# Patient Record
Sex: FEMALE | Race: WHITE | NOT HISPANIC OR LATINO | URBAN - METROPOLITAN AREA
[De-identification: names, ages, dates, MRNs, and addresses within clinical notes are randomized per-mention and may not be internally consistent; named-entity substitution may affect disease eponyms.]

---

## 2017-10-04 ENCOUNTER — ON CAMPUS - OUTPATIENT (AMBULATORY)
Dept: URBAN - METROPOLITAN AREA HOSPITAL 77 | Facility: HOSPITAL | Age: 67
End: 2017-10-04
Payer: MEDICAID

## 2017-10-04 DIAGNOSIS — K63.89 OTHER SPECIFIED DISEASES OF INTESTINE: ICD-10-CM

## 2017-10-04 DIAGNOSIS — Z86.010 PERSONAL HISTORY OF COLONIC POLYPS: ICD-10-CM

## 2017-10-04 DIAGNOSIS — K64.8 OTHER HEMORRHOIDS: ICD-10-CM

## 2017-10-04 DIAGNOSIS — K57.90 DIVERTICULOSIS OF INTESTINE, PART UNSPECIFIED, WITHOUT PERFO: ICD-10-CM

## 2017-10-04 DIAGNOSIS — R13.10 DYSPHAGIA, UNSPECIFIED: ICD-10-CM

## 2017-10-04 DIAGNOSIS — K56.699 OTHER INTESTINAL OBSTRUCTION UNSPECIFIED AS TO PARTIAL VERSU: ICD-10-CM

## 2017-10-04 DIAGNOSIS — D12.0 BENIGN NEOPLASM OF CECUM: ICD-10-CM

## 2017-10-04 DIAGNOSIS — K22.2 ESOPHAGEAL OBSTRUCTION: ICD-10-CM

## 2017-10-04 DIAGNOSIS — R11.0 NAUSEA: ICD-10-CM

## 2017-10-04 PROCEDURE — 45385 COLONOSCOPY W/LESION REMOVAL: CPT | Mod: PT | Performed by: INTERNAL MEDICINE

## 2017-10-04 PROCEDURE — 45386 COLONOSCOPY W/BALLOON DILAT: CPT | Performed by: INTERNAL MEDICINE

## 2017-10-04 PROCEDURE — 43235 EGD DIAGNOSTIC BRUSH WASH: CPT | Mod: 59 | Performed by: INTERNAL MEDICINE

## 2017-10-04 PROCEDURE — 43450 DILATE ESOPHAGUS 1/MULT PASS: CPT | Performed by: INTERNAL MEDICINE

## 2018-04-11 ENCOUNTER — OFFICE (AMBULATORY)
Dept: URBAN - METROPOLITAN AREA CLINIC 64 | Facility: CLINIC | Age: 68
End: 2018-04-11

## 2018-04-11 VITALS
SYSTOLIC BLOOD PRESSURE: 113 MMHG | WEIGHT: 158 LBS | DIASTOLIC BLOOD PRESSURE: 65 MMHG | HEART RATE: 70 BPM | HEIGHT: 60 IN

## 2018-04-11 DIAGNOSIS — Z91.14 PATIENT'S OTHER NONCOMPLIANCE WITH MEDICATION REGIMEN: ICD-10-CM

## 2018-04-11 DIAGNOSIS — Z86.010 PERSONAL HISTORY OF COLONIC POLYPS: ICD-10-CM

## 2018-04-11 DIAGNOSIS — R19.7 DIARRHEA, UNSPECIFIED: ICD-10-CM

## 2018-04-11 DIAGNOSIS — K21.9 GASTRO-ESOPHAGEAL REFLUX DISEASE WITHOUT ESOPHAGITIS: ICD-10-CM

## 2018-04-11 DIAGNOSIS — K63.89 OTHER SPECIFIED DISEASES OF INTESTINE: ICD-10-CM

## 2018-04-11 PROCEDURE — 99214 OFFICE O/P EST MOD 30 MIN: CPT | Performed by: NURSE PRACTITIONER

## 2018-04-12 ENCOUNTER — OFFICE (AMBULATORY)
Dept: URBAN - METROPOLITAN AREA LAB 2 | Facility: LAB | Age: 68
End: 2018-04-12
Payer: MEDICARE

## 2018-04-12 DIAGNOSIS — B96.20 UNSPECIFIED ESCHERICHIA COLI [E. COLI] AS THE CAUSE OF DISEA: ICD-10-CM

## 2018-04-12 DIAGNOSIS — A08.2 ADENOVIRAL ENTERITIS: ICD-10-CM

## 2018-04-12 DIAGNOSIS — R19.7 DIARRHEA, UNSPECIFIED: ICD-10-CM

## 2018-04-12 DIAGNOSIS — A04.72 ENTEROCOLITIS DUE TO CLOSTRIDIUM DIFFICILE, NOT SPECIFIED AS: ICD-10-CM

## 2018-04-12 PROCEDURE — 87507 IADNA-DNA/RNA PROBE TQ 12-25: CPT | Performed by: NURSE PRACTITIONER

## 2018-05-16 ENCOUNTER — OFFICE (AMBULATORY)
Dept: URBAN - METROPOLITAN AREA CLINIC 64 | Facility: CLINIC | Age: 68
End: 2018-05-16

## 2018-05-16 VITALS
HEART RATE: 66 BPM | DIASTOLIC BLOOD PRESSURE: 93 MMHG | SYSTOLIC BLOOD PRESSURE: 151 MMHG | HEIGHT: 60 IN | WEIGHT: 151 LBS

## 2018-05-16 DIAGNOSIS — F11.20 OPIOID DEPENDENCE, UNCOMPLICATED: ICD-10-CM

## 2018-05-16 DIAGNOSIS — A08.2 ADENOVIRAL ENTERITIS: ICD-10-CM

## 2018-05-16 DIAGNOSIS — R53.83 OTHER FATIGUE: ICD-10-CM

## 2018-05-16 DIAGNOSIS — B96.20 UNSPECIFIED ESCHERICHIA COLI [E. COLI] AS THE CAUSE OF DISEA: ICD-10-CM

## 2018-05-16 DIAGNOSIS — K59.00 CONSTIPATION, UNSPECIFIED: ICD-10-CM

## 2018-05-16 DIAGNOSIS — A04.72 ENTEROCOLITIS DUE TO CLOSTRIDIUM DIFFICILE, NOT SPECIFIED AS: ICD-10-CM

## 2018-05-16 PROCEDURE — 99214 OFFICE O/P EST MOD 30 MIN: CPT | Performed by: NURSE PRACTITIONER

## 2018-05-24 ENCOUNTER — HOSPITAL ENCOUNTER (OUTPATIENT)
Dept: ONCOLOGY | Facility: HOSPITAL | Age: 68
Discharge: HOME | End: 2018-05-24
Attending: INTERNAL MEDICINE | Admitting: INTERNAL MEDICINE

## 2018-05-24 ENCOUNTER — HOSPITAL ENCOUNTER (OUTPATIENT)
Dept: ONCOLOGY | Facility: CLINIC | Age: 68
Setting detail: INFUSION SERIES
Discharge: HOME | End: 2018-05-24
Attending: INTERNAL MEDICINE | Admitting: INTERNAL MEDICINE

## 2018-05-24 ENCOUNTER — CLINICAL SUPPORT (OUTPATIENT)
Dept: ONCOLOGY | Facility: HOSPITAL | Age: 68
End: 2018-05-24

## 2018-05-24 NOTE — PROGRESS NOTES
PATIENTS ONCOLOGY RECORD LOCATED IN [facility]      Subjective     Name:  CHARLOTTE REID     Date:  2018  Address:  [address] [place] [id number]  Home: [phone]  :  1950 AGE:  67 y.o.        RECORDS OBTAINED:  Patients Oncology Record is located in [facility]

## 2018-05-25 LAB
ANA SER QL IA: NORMAL

## 2018-05-31 ENCOUNTER — HOSPITAL ENCOUNTER (OUTPATIENT)
Dept: ONCOLOGY | Facility: CLINIC | Age: 68
Setting detail: INFUSION SERIES
Discharge: HOME | End: 2018-05-31
Attending: INTERNAL MEDICINE | Admitting: INTERNAL MEDICINE

## 2018-05-31 ENCOUNTER — HOSPITAL ENCOUNTER (OUTPATIENT)
Dept: ONCOLOGY | Facility: HOSPITAL | Age: 68
Discharge: HOME | End: 2018-05-31
Attending: NURSE PRACTITIONER | Admitting: NURSE PRACTITIONER

## 2018-05-31 ENCOUNTER — CLINICAL SUPPORT (OUTPATIENT)
Dept: ONCOLOGY | Facility: HOSPITAL | Age: 68
End: 2018-05-31

## 2018-05-31 NOTE — PROGRESS NOTES
PATIENTS ONCOLOGY RECORD LOCATED IN [facility]      Subjective     Name:  CHARLOTTE REID     Date:  2018  Address:  [address] [place] [id number]  Home: [phone]  :  1950 AGE:  67 y.o.        RECORDS OBTAINED:  Patients Oncology Record is located in [facility]

## 2018-07-02 ENCOUNTER — CLINICAL SUPPORT (OUTPATIENT)
Dept: ONCOLOGY | Facility: HOSPITAL | Age: 68
End: 2018-07-02

## 2018-07-02 ENCOUNTER — HOSPITAL ENCOUNTER (OUTPATIENT)
Dept: ONCOLOGY | Facility: CLINIC | Age: 68
Setting detail: INFUSION SERIES
Discharge: HOME | End: 2018-07-02
Attending: INTERNAL MEDICINE | Admitting: INTERNAL MEDICINE

## 2018-07-02 NOTE — PROGRESS NOTES
PATIENTS ONCOLOGY RECORD LOCATED IN [facility]      Subjective     Name:  CHARLOTTE REID     Date:  2018  Address:  [address] [place] [id number]  Home: [phone]  :  1950 AGE:  67 y.o.        RECORDS OBTAINED:  Patients Oncology Record is located in [facility]

## 2018-07-22 ENCOUNTER — ON CAMPUS - OUTPATIENT (AMBULATORY)
Dept: URBAN - METROPOLITAN AREA HOSPITAL 77 | Facility: HOSPITAL | Age: 68
End: 2018-07-22

## 2018-07-22 DIAGNOSIS — R19.7 DIARRHEA, UNSPECIFIED: ICD-10-CM

## 2018-07-22 DIAGNOSIS — R14.0 ABDOMINAL DISTENSION (GASEOUS): ICD-10-CM

## 2018-07-22 PROCEDURE — 99214 OFFICE O/P EST MOD 30 MIN: CPT | Performed by: INTERNAL MEDICINE

## 2018-08-01 ENCOUNTER — OFFICE (AMBULATORY)
Dept: URBAN - METROPOLITAN AREA CLINIC 64 | Facility: CLINIC | Age: 68
End: 2018-08-01

## 2018-08-01 VITALS
SYSTOLIC BLOOD PRESSURE: 136 MMHG | WEIGHT: 152 LBS | DIASTOLIC BLOOD PRESSURE: 82 MMHG | HEART RATE: 74 BPM | HEIGHT: 60 IN

## 2018-08-01 DIAGNOSIS — R10.11 RIGHT UPPER QUADRANT PAIN: ICD-10-CM

## 2018-08-01 DIAGNOSIS — K21.9 GASTRO-ESOPHAGEAL REFLUX DISEASE WITHOUT ESOPHAGITIS: ICD-10-CM

## 2018-08-01 DIAGNOSIS — R14.0 ABDOMINAL DISTENSION (GASEOUS): ICD-10-CM

## 2018-08-01 PROCEDURE — 99214 OFFICE O/P EST MOD 30 MIN: CPT | Performed by: NURSE PRACTITIONER

## 2018-08-01 RX ORDER — SIMETHICONE 250 MG/1
CAPSULE, GELATIN COATED ORAL
Qty: 250 | Refills: 11 | Status: COMPLETED
Start: 2018-08-01 | End: 2018-10-11

## 2018-08-01 RX ORDER — HYOSCYAMINE SULFATE 0.12 MG/1
0.38 TABLET ORAL; SUBLINGUAL
Qty: 90 | Refills: 4 | Status: COMPLETED
Start: 2018-08-01 | End: 2018-10-11

## 2018-08-06 ENCOUNTER — HOSPITAL ENCOUNTER (OUTPATIENT)
Dept: ONCOLOGY | Facility: CLINIC | Age: 68
Setting detail: INFUSION SERIES
Discharge: HOME | End: 2018-08-06
Attending: INTERNAL MEDICINE | Admitting: INTERNAL MEDICINE

## 2018-10-11 ENCOUNTER — OFFICE (AMBULATORY)
Dept: URBAN - METROPOLITAN AREA CLINIC 64 | Facility: CLINIC | Age: 68
End: 2018-10-11

## 2018-10-11 VITALS
HEIGHT: 60 IN | WEIGHT: 153 LBS | SYSTOLIC BLOOD PRESSURE: 128 MMHG | DIASTOLIC BLOOD PRESSURE: 76 MMHG | HEART RATE: 77 BPM

## 2018-10-11 DIAGNOSIS — R14.0 ABDOMINAL DISTENSION (GASEOUS): ICD-10-CM

## 2018-10-11 DIAGNOSIS — R19.4 CHANGE IN BOWEL HABIT: ICD-10-CM

## 2018-10-11 PROCEDURE — 99214 OFFICE O/P EST MOD 30 MIN: CPT | Performed by: INTERNAL MEDICINE

## 2018-12-19 ENCOUNTER — INPATIENT HOSPITAL (AMBULATORY)
Dept: URBAN - METROPOLITAN AREA HOSPITAL 76 | Facility: HOSPITAL | Age: 68
End: 2018-12-19

## 2018-12-19 DIAGNOSIS — R10.13 EPIGASTRIC PAIN: ICD-10-CM

## 2018-12-19 DIAGNOSIS — R94.5 ABNORMAL RESULTS OF LIVER FUNCTION STUDIES: ICD-10-CM

## 2018-12-19 DIAGNOSIS — Z96.9 PRESENCE OF FUNCTIONAL IMPLANT, UNSPECIFIED: ICD-10-CM

## 2018-12-19 DIAGNOSIS — R11.2 NAUSEA WITH VOMITING, UNSPECIFIED: ICD-10-CM

## 2018-12-19 PROCEDURE — 99222 1ST HOSP IP/OBS MODERATE 55: CPT | Mod: 25 | Performed by: INTERNAL MEDICINE

## 2018-12-19 PROCEDURE — 43235 EGD DIAGNOSTIC BRUSH WASH: CPT | Performed by: INTERNAL MEDICINE

## 2018-12-20 ENCOUNTER — INPATIENT HOSPITAL (AMBULATORY)
Dept: URBAN - METROPOLITAN AREA HOSPITAL 76 | Facility: HOSPITAL | Age: 68
End: 2018-12-20

## 2018-12-20 DIAGNOSIS — R94.5 ABNORMAL RESULTS OF LIVER FUNCTION STUDIES: ICD-10-CM

## 2018-12-20 DIAGNOSIS — K59.04 CHRONIC IDIOPATHIC CONSTIPATION: ICD-10-CM

## 2018-12-20 DIAGNOSIS — R11.2 NAUSEA WITH VOMITING, UNSPECIFIED: ICD-10-CM

## 2018-12-20 DIAGNOSIS — R10.13 EPIGASTRIC PAIN: ICD-10-CM

## 2018-12-20 PROCEDURE — 99232 SBSQ HOSP IP/OBS MODERATE 35: CPT | Performed by: NURSE PRACTITIONER

## 2018-12-21 ENCOUNTER — INPATIENT HOSPITAL (AMBULATORY)
Dept: URBAN - METROPOLITAN AREA HOSPITAL 76 | Facility: HOSPITAL | Age: 68
End: 2018-12-21

## 2018-12-21 DIAGNOSIS — R11.2 NAUSEA WITH VOMITING, UNSPECIFIED: ICD-10-CM

## 2018-12-21 DIAGNOSIS — R10.13 EPIGASTRIC PAIN: ICD-10-CM

## 2018-12-21 DIAGNOSIS — R94.5 ABNORMAL RESULTS OF LIVER FUNCTION STUDIES: ICD-10-CM

## 2018-12-21 DIAGNOSIS — K59.04 CHRONIC IDIOPATHIC CONSTIPATION: ICD-10-CM

## 2018-12-21 PROCEDURE — 99231 SBSQ HOSP IP/OBS SF/LOW 25: CPT | Performed by: NURSE PRACTITIONER

## 2018-12-27 ENCOUNTER — INPATIENT HOSPITAL (AMBULATORY)
Dept: URBAN - METROPOLITAN AREA HOSPITAL 84 | Facility: HOSPITAL | Age: 68
End: 2018-12-27

## 2018-12-27 DIAGNOSIS — R11.2 NAUSEA WITH VOMITING, UNSPECIFIED: ICD-10-CM

## 2018-12-27 DIAGNOSIS — R10.84 GENERALIZED ABDOMINAL PAIN: ICD-10-CM

## 2018-12-27 PROCEDURE — 99222 1ST HOSP IP/OBS MODERATE 55: CPT | Performed by: NURSE PRACTITIONER

## 2018-12-28 ENCOUNTER — INPATIENT HOSPITAL (AMBULATORY)
Dept: URBAN - METROPOLITAN AREA HOSPITAL 84 | Facility: HOSPITAL | Age: 68
End: 2018-12-28

## 2018-12-28 DIAGNOSIS — R10.13 EPIGASTRIC PAIN: ICD-10-CM

## 2018-12-28 DIAGNOSIS — R11.2 NAUSEA WITH VOMITING, UNSPECIFIED: ICD-10-CM

## 2018-12-28 PROCEDURE — 44360 SMALL BOWEL ENDOSCOPY: CPT | Performed by: INTERNAL MEDICINE

## 2018-12-29 ENCOUNTER — INPATIENT HOSPITAL (AMBULATORY)
Dept: URBAN - METROPOLITAN AREA HOSPITAL 84 | Facility: HOSPITAL | Age: 68
End: 2018-12-29

## 2018-12-29 DIAGNOSIS — R10.9 UNSPECIFIED ABDOMINAL PAIN: ICD-10-CM

## 2018-12-29 DIAGNOSIS — E11.9 TYPE 2 DIABETES MELLITUS WITHOUT COMPLICATIONS: ICD-10-CM

## 2018-12-29 DIAGNOSIS — R11.2 NAUSEA WITH VOMITING, UNSPECIFIED: ICD-10-CM

## 2018-12-29 DIAGNOSIS — R94.5 ABNORMAL RESULTS OF LIVER FUNCTION STUDIES: ICD-10-CM

## 2018-12-29 DIAGNOSIS — K21.9 GASTRO-ESOPHAGEAL REFLUX DISEASE WITHOUT ESOPHAGITIS: ICD-10-CM

## 2018-12-29 PROCEDURE — 99232 SBSQ HOSP IP/OBS MODERATE 35: CPT | Performed by: NURSE PRACTITIONER

## 2018-12-31 ENCOUNTER — INPATIENT HOSPITAL (AMBULATORY)
Dept: URBAN - METROPOLITAN AREA HOSPITAL 84 | Facility: HOSPITAL | Age: 68
End: 2018-12-31

## 2018-12-31 DIAGNOSIS — R10.10 UPPER ABDOMINAL PAIN, UNSPECIFIED: ICD-10-CM

## 2018-12-31 DIAGNOSIS — K21.9 GASTRO-ESOPHAGEAL REFLUX DISEASE WITHOUT ESOPHAGITIS: ICD-10-CM

## 2018-12-31 DIAGNOSIS — E11.9 TYPE 2 DIABETES MELLITUS WITHOUT COMPLICATIONS: ICD-10-CM

## 2018-12-31 DIAGNOSIS — R94.5 ABNORMAL RESULTS OF LIVER FUNCTION STUDIES: ICD-10-CM

## 2018-12-31 DIAGNOSIS — K59.00 CONSTIPATION, UNSPECIFIED: ICD-10-CM

## 2018-12-31 DIAGNOSIS — R11.2 NAUSEA WITH VOMITING, UNSPECIFIED: ICD-10-CM

## 2018-12-31 PROCEDURE — 99232 SBSQ HOSP IP/OBS MODERATE 35: CPT | Performed by: NURSE PRACTITIONER

## 2019-01-07 ENCOUNTER — OFFICE (AMBULATORY)
Dept: URBAN - METROPOLITAN AREA CLINIC 64 | Facility: CLINIC | Age: 69
End: 2019-01-07

## 2019-01-07 VITALS
HEIGHT: 60 IN | DIASTOLIC BLOOD PRESSURE: 63 MMHG | WEIGHT: 150 LBS | SYSTOLIC BLOOD PRESSURE: 109 MMHG | HEART RATE: 80 BPM

## 2019-01-07 DIAGNOSIS — R94.5 ABNORMAL RESULTS OF LIVER FUNCTION STUDIES: ICD-10-CM

## 2019-01-07 DIAGNOSIS — R11.2 NAUSEA WITH VOMITING, UNSPECIFIED: ICD-10-CM

## 2019-01-07 DIAGNOSIS — K59.00 CONSTIPATION, UNSPECIFIED: ICD-10-CM

## 2019-01-07 DIAGNOSIS — R10.11 RIGHT UPPER QUADRANT PAIN: ICD-10-CM

## 2019-01-07 PROCEDURE — 99213 OFFICE O/P EST LOW 20 MIN: CPT | Performed by: NURSE PRACTITIONER

## 2019-01-07 RX ORDER — METHYLNALTREXONE BROMIDE 150 MG/1
450 TABLET ORAL
Qty: 90 | Refills: 11 | Status: ACTIVE
Start: 2019-01-07

## 2022-07-12 ENCOUNTER — HOSPITAL ENCOUNTER (EMERGENCY)
Facility: HOSPITAL | Age: 72
Discharge: HOME | End: 2022-07-13
Attending: EMERGENCY MEDICINE | Admitting: EMERGENCY MEDICINE

## 2022-07-12 ENCOUNTER — APPOINTMENT (OUTPATIENT)
Dept: GENERAL RADIOLOGY | Facility: HOSPITAL | Age: 72
End: 2022-07-12

## 2022-07-12 ENCOUNTER — APPOINTMENT (OUTPATIENT)
Dept: CT IMAGING | Facility: HOSPITAL | Age: 72
End: 2022-07-12

## 2022-07-12 DIAGNOSIS — R53.83 OTHER FATIGUE: Primary | ICD-10-CM

## 2022-07-12 DIAGNOSIS — R11.0 NAUSEA: ICD-10-CM

## 2022-07-12 DIAGNOSIS — R10.9 ABDOMINAL PAIN, UNSPECIFIED ABDOMINAL LOCATION: ICD-10-CM

## 2022-07-12 LAB
BASOPHILS # BLD AUTO: 0.08 10*3/MM3 (ref 0–0.2)
BASOPHILS NFR BLD AUTO: 1 % (ref 0–1.5)
BILIRUB UR QL STRIP: NEGATIVE
CLARITY UR: CLEAR
COLOR UR: YELLOW
DEPRECATED RDW RBC AUTO: 35.9 FL (ref 37–54)
EOSINOPHIL # BLD AUTO: 0.01 10*3/MM3 (ref 0–0.4)
EOSINOPHIL NFR BLD AUTO: 0.1 % (ref 0.3–6.2)
ERYTHROCYTE [DISTWIDTH] IN BLOOD BY AUTOMATED COUNT: 12 % (ref 12.3–15.4)
FLUAV SUBTYP SPEC NAA+PROBE: NOT DETECTED
FLUBV RNA ISLT QL NAA+PROBE: NOT DETECTED
GLUCOSE UR STRIP-MCNC: NEGATIVE MG/DL
HCT VFR BLD AUTO: 36.4 % (ref 34–46.6)
HGB BLD-MCNC: 12.7 G/DL (ref 12–15.9)
HGB UR QL STRIP.AUTO: NEGATIVE
IMM GRANULOCYTES # BLD AUTO: 0.02 10*3/MM3 (ref 0–0.05)
IMM GRANULOCYTES NFR BLD AUTO: 0.2 % (ref 0–0.5)
KETONES UR QL STRIP: ABNORMAL
LEUKOCYTE ESTERASE UR QL STRIP.AUTO: NEGATIVE
LIPASE SERPL-CCNC: 43 U/L (ref 13–60)
LYMPHOCYTES # BLD AUTO: 2.41 10*3/MM3 (ref 0.7–3.1)
LYMPHOCYTES NFR BLD AUTO: 30 % (ref 19.6–45.3)
MCH RBC QN AUTO: 28.8 PG (ref 26.6–33)
MCHC RBC AUTO-ENTMCNC: 34.9 G/DL (ref 31.5–35.7)
MCV RBC AUTO: 82.5 FL (ref 79–97)
MONOCYTES # BLD AUTO: 0.7 10*3/MM3 (ref 0.1–0.9)
MONOCYTES NFR BLD AUTO: 8.7 % (ref 5–12)
NEUTROPHILS NFR BLD AUTO: 4.8 10*3/MM3 (ref 1.7–7)
NEUTROPHILS NFR BLD AUTO: 60 % (ref 42.7–76)
NITRITE UR QL STRIP: NEGATIVE
NRBC BLD AUTO-RTO: 0 /100 WBC (ref 0–0.2)
PH UR STRIP.AUTO: 7.5 [PH] (ref 5–9)
PLATELET # BLD AUTO: 269 10*3/MM3 (ref 140–450)
PMV BLD AUTO: 9.8 FL (ref 6–12)
PROT UR QL STRIP: NEGATIVE
RBC # BLD AUTO: 4.41 10*6/MM3 (ref 3.77–5.28)
SARS-COV-2 RNA PNL SPEC NAA+PROBE: NOT DETECTED
SP GR UR STRIP: 1.01 (ref 1–1.03)
TROPONIN T SERPL-MCNC: <0.01 NG/ML (ref 0–0.03)
UROBILINOGEN UR QL STRIP: ABNORMAL
WBC NRBC COR # BLD: 8.02 10*3/MM3 (ref 3.4–10.8)

## 2022-07-12 PROCEDURE — 87636 SARSCOV2 & INF A&B AMP PRB: CPT | Performed by: EMERGENCY MEDICINE

## 2022-07-12 PROCEDURE — 85025 COMPLETE CBC W/AUTO DIFF WBC: CPT | Performed by: EMERGENCY MEDICINE

## 2022-07-12 PROCEDURE — 99284 EMERGENCY DEPT VISIT MOD MDM: CPT

## 2022-07-12 PROCEDURE — 96374 THER/PROPH/DIAG INJ IV PUSH: CPT

## 2022-07-12 PROCEDURE — 25010000002 ONDANSETRON PER 1 MG: Performed by: EMERGENCY MEDICINE

## 2022-07-12 PROCEDURE — 83690 ASSAY OF LIPASE: CPT | Performed by: EMERGENCY MEDICINE

## 2022-07-12 PROCEDURE — 84484 ASSAY OF TROPONIN QUANT: CPT | Performed by: EMERGENCY MEDICINE

## 2022-07-12 PROCEDURE — 36415 COLL VENOUS BLD VENIPUNCTURE: CPT

## 2022-07-12 PROCEDURE — 81003 URINALYSIS AUTO W/O SCOPE: CPT | Performed by: EMERGENCY MEDICINE

## 2022-07-12 PROCEDURE — 71045 X-RAY EXAM CHEST 1 VIEW: CPT

## 2022-07-12 PROCEDURE — 93005 ELECTROCARDIOGRAM TRACING: CPT | Performed by: EMERGENCY MEDICINE

## 2022-07-12 PROCEDURE — 80053 COMPREHEN METABOLIC PANEL: CPT | Performed by: EMERGENCY MEDICINE

## 2022-07-12 PROCEDURE — 93010 ELECTROCARDIOGRAM REPORT: CPT | Performed by: INTERNAL MEDICINE

## 2022-07-12 RX ORDER — ONDANSETRON 2 MG/ML
4 INJECTION INTRAMUSCULAR; INTRAVENOUS ONCE
Status: COMPLETED | OUTPATIENT
Start: 2022-07-12 | End: 2022-07-12

## 2022-07-12 RX ADMIN — SODIUM CHLORIDE 1000 ML: 9 INJECTION, SOLUTION INTRAVENOUS at 21:56

## 2022-07-12 RX ADMIN — ONDANSETRON 4 MG: 2 INJECTION INTRAMUSCULAR; INTRAVENOUS at 21:56

## 2022-07-13 ENCOUNTER — APPOINTMENT (OUTPATIENT)
Dept: CT IMAGING | Facility: HOSPITAL | Age: 72
End: 2022-07-13

## 2022-07-13 VITALS
OXYGEN SATURATION: 98 % | RESPIRATION RATE: 18 BRPM | WEIGHT: 130 LBS | TEMPERATURE: 98.6 F | HEIGHT: 60 IN | DIASTOLIC BLOOD PRESSURE: 80 MMHG | BODY MASS INDEX: 25.52 KG/M2 | SYSTOLIC BLOOD PRESSURE: 174 MMHG | HEART RATE: 57 BPM

## 2022-07-13 LAB
ALBUMIN SERPL-MCNC: 3.9 G/DL (ref 3.5–5.2)
ALBUMIN/GLOB SERPL: 1.3 G/DL
ALP SERPL-CCNC: 111 U/L (ref 39–117)
ALT SERPL W P-5'-P-CCNC: 52 U/L (ref 1–33)
ANION GAP SERPL CALCULATED.3IONS-SCNC: 13 MMOL/L (ref 5–15)
AST SERPL-CCNC: 45 U/L (ref 1–32)
BILIRUB SERPL-MCNC: 1.4 MG/DL (ref 0–1.2)
BUN SERPL-MCNC: 9 MG/DL (ref 8–23)
BUN/CREAT SERPL: 12.9 (ref 7–25)
CALCIUM SPEC-SCNC: 8.7 MG/DL (ref 8.6–10.5)
CHLORIDE SERPL-SCNC: 104 MMOL/L (ref 98–107)
CO2 SERPL-SCNC: 20 MMOL/L (ref 22–29)
CREAT SERPL-MCNC: 0.7 MG/DL (ref 0.57–1)
EGFRCR SERPLBLD CKD-EPI 2021: 92.6 ML/MIN/1.73
GLOBULIN UR ELPH-MCNC: 2.9 GM/DL
GLUCOSE SERPL-MCNC: 127 MG/DL (ref 65–99)
POTASSIUM SERPL-SCNC: 3.5 MMOL/L (ref 3.5–5.2)
PROT SERPL-MCNC: 6.8 G/DL (ref 6–8.5)
SODIUM SERPL-SCNC: 137 MMOL/L (ref 136–145)

## 2022-07-13 PROCEDURE — 25010000002 IOPAMIDOL 61 % SOLUTION: Performed by: EMERGENCY MEDICINE

## 2022-07-13 PROCEDURE — 74177 CT ABD & PELVIS W/CONTRAST: CPT

## 2022-07-13 RX ADMIN — IOPAMIDOL 90 ML: 612 INJECTION, SOLUTION INTRAVENOUS at 00:54

## 2022-07-13 NOTE — ED PROVIDER NOTES
Subjective     Weakness - Generalized  Severity:  Moderate  Onset quality:  Gradual  Duration: Episodes last a week or so occurring approximately every other month for the past 3 years.  Progression:  Waxing and waning  Chronicity:  New  Context: not change in medication    Context comment:  Prior history of bariatric surgery.  Has had prior GI work-ups and primary care work-ups and cardiology work-ups for this issue.  Relieved by:  Nothing  Worsened by:  Nothing  Ineffective treatments: Home medications.  Associated symptoms: no abdominal pain, no chest pain, no diarrhea, no numbness in extremities, no fever, no headaches, no loss of consciousness, no shortness of breath, no syncope, no vision change and no vomiting    Associated symptoms comment:  Constipation for 4 days      Review of Systems   Constitutional: Negative for fever.   Respiratory: Negative for shortness of breath.    Cardiovascular: Negative for chest pain and syncope.   Gastrointestinal: Negative for abdominal pain, diarrhea and vomiting.   Neurological: Negative for loss of consciousness and headaches.   All other systems reviewed and are negative.      History reviewed. No pertinent past medical history.    Allergies   Allergen Reactions   • Lansoprazole Hives   • Propoxyphene GI Intolerance   • Tape Rash   • Valium [Diazepam] Anxiety   • Versed [Midazolam] Anxiety       History reviewed. No pertinent surgical history.    History reviewed. No pertinent family history.    Social History     Socioeconomic History   • Marital status:            Objective   Physical Exam  Vitals and nursing note reviewed.   Constitutional:       Appearance: She is not ill-appearing.   HENT:      Head: Normocephalic and atraumatic.      Right Ear: External ear normal.      Left Ear: External ear normal.      Nose: Nose normal.      Mouth/Throat:      Mouth: Mucous membranes are moist.      Pharynx: Oropharynx is clear.   Eyes:      General: No scleral  icterus.  Cardiovascular:      Rate and Rhythm: Normal rate and regular rhythm.   Pulmonary:      Effort: Pulmonary effort is normal.      Breath sounds: Normal breath sounds.   Abdominal:      General: Abdomen is flat.      Tenderness: There is no abdominal tenderness.   Musculoskeletal:         General: No signs of injury.   Skin:     General: Skin is warm and dry.   Neurological:      Mental Status: She is alert and oriented to person, place, and time.      Sensory: No sensory deficit.      Motor: No weakness.   Psychiatric:      Comments: Depressed         Procedures  none         ED Course      Labs Reviewed   COMPREHENSIVE METABOLIC PANEL - Abnormal; Notable for the following components:       Result Value    Glucose 127 (*)     CO2 20.0 (*)     ALT (SGPT) 52 (*)     AST (SGOT) 45 (*)     Total Bilirubin 1.4 (*)     All other components within normal limits    Narrative:     GFR Normal >60  Chronic Kidney Disease <60  Kidney Failure <15     URINALYSIS W/ CULTURE IF INDICATED - Abnormal; Notable for the following components:    Ketones, UA 15 mg/dL (1+) (*)     All other components within normal limits    Narrative:     In absence of clinical symptoms, the presence of pyuria, bacteria, and/or nitrites on the urinalysis result does not correlate with infection.  Urine microscopic not indicated.   CBC WITH AUTO DIFFERENTIAL - Abnormal; Notable for the following components:    RDW 12.0 (*)     RDW-SD 35.9 (*)     Eosinophil % 0.1 (*)     All other components within normal limits   COVID-19 AND FLU A/B, NP SWAB IN TRANSPORT MEDIA 8-12 HR TAT - Normal    Narrative:     Fact sheet for providers: https://www.fda.gov/media/999549/download    Fact sheet for patients: https://www.fda.gov/media/749309/download    Test performed by PCR.   LIPASE - Normal   TROPONIN (IN-HOUSE) - Normal    Narrative:     Troponin T Reference Range:  <= 0.03 ng/mL-   Negative for AMI  >0.03 ng/mL-     Abnormal for myocardial necrosis.   Clinicians would have to utilize clinical acumen, EKG, Troponin and serial changes to determine if it is an Acute Myocardial Infarction or myocardial injury due to an underlying chronic condition.       Results may be falsely decreased if patient taking Biotin.     CBC AND DIFFERENTIAL    Narrative:     The following orders were created for panel order CBC & Differential.  Procedure                               Abnormality         Status                     ---------                               -----------         ------                     CBC Auto Differential[[id number]]        Abnormal            Final result                 Please view results for these tests on the individual orders.     CT Abdomen Pelvis With Contrast    Result Date: 7/13/2022  Narrative: EXAM:   CT Abdomen and Pelvis With Intravenous Contrast CLINICAL HISTORY:   The patient is 71 years old and is Female; Abdominal pain, acute, nonlocalized, R53.83 Other fatigue R11.0 Nausea R10.9 Unspecified abdominal pain TECHNIQUE:   Axial computed tomography images of the abdomen and pelvis with intravenous contrast.  Sagittal and coronal reformatted images were created and reviewed.  This CT exam was performed using one or more of the following dose reduction techniques:  automated exposure control, adjustment of the mA and/or kV according to patient size, and/or use of iterative reconstruction technique. COMPARISON:   No relevant prior studies available. FINDINGS:   LUNG BASES:  Unremarkable.  No mass.  No consolidation.  ABDOMEN:   LIVER:  The liver is diffusely fatty.   GALLBLADDER AND BILE DUCTS:  Surgical clips are present in the right upper quadrant, consistent with previous cholecystectomy.   PANCREAS:  No ductal dilation.  No mass.   SPLEEN:  Unremarkable.   ADRENALS:  Unremarkable.  No mass.   KIDNEYS AND URETERS:  Bilateral intrarenal calcifications are present. The kidneys enhance symmetrically. There is no hydronephrosis or hydroureter of  either kidney. No obstructing renal or ureteral calculus is seen.   STOMACH AND BOWEL:  Postsurgical change of the stomach is present. The small bowel is normal in caliber. Stool is present throughout colon. There is no mucosal thickening or evidence of bowel obstruction.  PELVIS:   APPENDIX:  No findings to suggest acute appendicitis.   BLADDER:  The bladder is moderately distended.   REPRODUCTIVE:  The patient is status post hysterectomy.  ABDOMEN and PELVIS:   INTRAPERITONEAL SPACE:  Surgical clips are present within the upper abdomen.  No free air.  No significant fluid collection.   BONES/JOINTS:  No acute fracture. Degenerative and postsurgical changes spine is present.   SOFT TISSUES:  Bilateral breast implants are partially visualized.   VASCULATURE:  Unremarkable.  No abdominal aortic aneurysm.   LYMPH NODES:  Unremarkable. No enlarged lymph nodes.   TUBES, LINES AND DEVICES:  Partially visualized catheter within the proximal small bowel is noted.     Impression:   No acute findings on this contrasted CT of the abdomen and pelvis to explain the patient's symptoms. Electronically signed by:  Eva Blanco MD  7/13/2022 1:12 AM CDT Workstation: [id number]    XR Chest 1 View    Result Date: 7/12/2022  Narrative: PROCEDURE: XR CHEST 1 VIEW, 7/12/2022 9:28 PM CDT CLINICAL INDICATION:    weakness. COMPARISON: None TECHNIQUE:  AP portable radiograph of chest FINDINGS: Tortuous aorta. Cardiac silhouette size within normal limits. Hazy opacification projecting over the mid and lower chest bilaterally is favored to reflect summation artifact from overlying soft tissues. Calcified granuloma projecting over the peripheral left lower lung. Distention or dilatation of loops of bowel underneath the left hemidiaphragm.     Impression: Distention or dilatation of loops of bowel underneath left hemidiaphragm. Hazy opacification projecting over the mid and lower chest bilaterally is favored reflect summation artifact from  overlying soft tissues. Alternatively, could reflect edema or other process if in an appropriate scenario. Electronically signed by:  Manuel Crawford MD  7/12/2022 9:52 PM CDT Workstation: [id number]        [facility]  Number of Diagnoses or Management Options     Amount and/or Complexity of Data Reviewed  Clinical lab tests: reviewed  Tests in the radiology section of CPT®: reviewed  Tests in the medicine section of CPT®: reviewed  Decide to obtain previous medical records or to obtain history from someone other than the patient: yes  Obtain history from someone other than the patient: yes  Review and summarize past medical records: yes      The patient has had recurrent bouts of weakness that appear to be fatigue that occur cyclically throughout the months over the past 3 years.  Remote past history of bariatric surgery.  Has had several work-ups to figure out this issue without specific diagnosis.  In addition the patient has chronic pain and is on a pain pump.  She has been constipated for about 4 days and has not taking any prescription medication for it.  Aside from the fatigue and constipation she is not able to specifically name any other significant symptoms.  Will conduct screening work-up and reevaluate the patient.    EKG interpretation: Interpreted myself.  Normal sinus rhythm.  Rate 64.  Normal P wave and MO interval.  Normal QRS and left axis deviation.  Normal QTc interval.  ST segments are normal.    Case signed out to Dr. Spicer at 12am for followup on redraw labs and ct to evaluate for surgical emergency such as bowel obstruction given xray findings and hx of remote prior bariatric surgery. If workup returns normal the pt can be discharged. Otherwise dispo per results.       Patient signed out to me by Dr. Ramires at the end of his shift.  Awaiting CMP and CT imaging.  CMP unremarkable and CT imaging reveals no acute cause of symptoms. Discharged to outpatient follow-up.      Final diagnoses:   Other  fatigue   Nausea   Abdominal pain, unspecified abdominal location         ED Disposition  ED Disposition     ED Disposition   Discharge    Condition   Stable    Comment   --             Morris Chicas, APRN  [address] [id number] [phone]               Medication List      No changes were made to your prescriptions during this visit.          Kendrick Rm, DO  07/12/22 [id number]       Musa Spicer, DO  07/13/22 0123

## 2022-07-14 LAB
QT INTERVAL: 418 MS
QTC INTERVAL: 431 MS

## 2022-07-19 ENCOUNTER — OFFICE VISIT (OUTPATIENT)
Dept: GASTROENTEROLOGY | Facility: CLINIC | Age: 72
End: 2022-07-19

## 2022-07-19 VITALS — WEIGHT: 128.2 LBS | BODY MASS INDEX: 25.04 KG/M2

## 2022-07-19 DIAGNOSIS — K62.5 HEMORRHAGE OF ANUS AND RECTUM: ICD-10-CM

## 2022-07-19 DIAGNOSIS — K59.09 OTHER CONSTIPATION: ICD-10-CM

## 2022-07-19 DIAGNOSIS — R19.7 DIARRHEA, UNSPECIFIED TYPE: Primary | ICD-10-CM

## 2022-07-19 DIAGNOSIS — R11.0 NAUSEA: ICD-10-CM

## 2022-07-19 PROCEDURE — 99204 OFFICE O/P NEW MOD 45 MIN: CPT | Performed by: INTERNAL MEDICINE

## 2022-07-19 RX ORDER — DIPHENHYDRAMINE HCL 25 MG
25 CAPSULE ORAL NIGHTLY
COMMUNITY

## 2022-07-19 RX ORDER — RIZATRIPTAN BENZOATE 10 MG/1
10 TABLET, ORALLY DISINTEGRATING ORAL AS NEEDED
COMMUNITY
Start: 2022-05-25

## 2022-07-19 RX ORDER — ASPIRIN 81 MG/1
81 TABLET ORAL DAILY
COMMUNITY

## 2022-07-19 RX ORDER — POLYETHYLENE GLYCOL-3350 AND ELECTROLYTES WITH FLAVOR PACK 240; 5.84; 2.98; 6.72; 22.72 G/278.26G; G/278.26G; G/278.26G; G/278.26G; G/278.26G
4000 POWDER, FOR SOLUTION ORAL ONCE
Qty: 4000 ML | Refills: 0 | Status: SHIPPED | OUTPATIENT
Start: 2022-07-19 | End: 2022-07-19

## 2022-07-19 RX ORDER — DULOXETIN HYDROCHLORIDE 60 MG/1
60 CAPSULE, DELAYED RELEASE ORAL 2 TIMES DAILY
COMMUNITY
Start: 2013-06-07

## 2022-07-19 RX ORDER — CETIRIZINE HYDROCHLORIDE 10 MG/1
10 TABLET ORAL DAILY
COMMUNITY
Start: 2013-06-07

## 2022-07-19 RX ORDER — NICOTINE POLACRILEX 4 MG/1
20 GUM, CHEWING ORAL DAILY
COMMUNITY
Start: 2013-06-07

## 2022-07-19 RX ORDER — CLONAZEPAM 0.5 MG/1
0.5 TABLET ORAL NIGHTLY
COMMUNITY
Start: 2018-11-21

## 2022-07-19 RX ORDER — TRAZODONE HYDROCHLORIDE 150 MG/1
150 TABLET ORAL
COMMUNITY
Start: 2022-05-25

## 2022-07-19 RX ORDER — DEXTROSE AND SODIUM CHLORIDE 5; .45 G/100ML; G/100ML
30 INJECTION, SOLUTION INTRAVENOUS CONTINUOUS PRN
Status: CANCELLED | OUTPATIENT
Start: 2022-07-22

## 2022-07-19 RX ORDER — DICYCLOMINE HCL 20 MG
20 TABLET ORAL EVERY 6 HOURS
Qty: 120 TABLET | Refills: 5 | Status: SHIPPED | OUTPATIENT
Start: 2022-07-19 | End: 2022-08-12

## 2022-07-19 RX ORDER — LEVOTHYROXINE SODIUM 0.05 MG/1
50 TABLET ORAL DAILY
COMMUNITY

## 2022-07-19 RX ORDER — PNV NO.95/FERROUS FUM/FOLIC AC 28MG-0.8MG
1 TABLET ORAL DAILY
COMMUNITY

## 2022-07-19 RX ORDER — PROMETHAZINE HYDROCHLORIDE 25 MG/1
25 SUPPOSITORY RECTAL EVERY 6 HOURS PRN
COMMUNITY
End: 2022-08-12

## 2022-07-19 RX ORDER — CELECOXIB 200 MG/1
200 CAPSULE ORAL DAILY
COMMUNITY
Start: 2013-06-07

## 2022-07-19 RX ORDER — POTASSIUM CHLORIDE 750 MG/1
10 TABLET, FILM COATED, EXTENDED RELEASE ORAL 2 TIMES DAILY
COMMUNITY
Start: 2014-08-29

## 2022-07-22 ENCOUNTER — ANESTHESIA (OUTPATIENT)
Dept: GASTROENTEROLOGY | Facility: HOSPITAL | Age: 72
End: 2022-07-22

## 2022-07-22 ENCOUNTER — ANESTHESIA EVENT (OUTPATIENT)
Dept: GASTROENTEROLOGY | Facility: HOSPITAL | Age: 72
End: 2022-07-22

## 2022-07-22 ENCOUNTER — HOSPITAL ENCOUNTER (OUTPATIENT)
Facility: HOSPITAL | Age: 72
Setting detail: HOSPITAL OUTPATIENT SURGERY
Discharge: HOME | End: 2022-07-22
Attending: INTERNAL MEDICINE | Admitting: INTERNAL MEDICINE

## 2022-07-22 VITALS
BODY MASS INDEX: 25.72 KG/M2 | OXYGEN SATURATION: 95 % | HEIGHT: 60 IN | TEMPERATURE: 97.4 F | HEART RATE: 74 BPM | WEIGHT: 131 LBS | SYSTOLIC BLOOD PRESSURE: 122 MMHG | DIASTOLIC BLOOD PRESSURE: 57 MMHG | RESPIRATION RATE: 18 BRPM

## 2022-07-22 DIAGNOSIS — K59.09 OTHER CONSTIPATION: ICD-10-CM

## 2022-07-22 DIAGNOSIS — K62.5 HEMORRHAGE OF ANUS AND RECTUM: ICD-10-CM

## 2022-07-22 DIAGNOSIS — R19.7 DIARRHEA, UNSPECIFIED TYPE: ICD-10-CM

## 2022-07-22 DIAGNOSIS — R11.0 NAUSEA: ICD-10-CM

## 2022-07-22 PROCEDURE — 45380 COLONOSCOPY AND BIOPSY: CPT | Performed by: INTERNAL MEDICINE

## 2022-07-22 PROCEDURE — 88305 TISSUE EXAM BY PATHOLOGIST: CPT

## 2022-07-22 PROCEDURE — 43239 EGD BIOPSY SINGLE/MULTIPLE: CPT | Performed by: INTERNAL MEDICINE

## 2022-07-22 PROCEDURE — 25010000002 PROPOFOL 10 MG/ML EMULSION: Performed by: NURSE ANESTHETIST, CERTIFIED REGISTERED

## 2022-07-22 RX ORDER — PROPOFOL 10 MG/ML
VIAL (ML) INTRAVENOUS AS NEEDED
Status: DISCONTINUED | OUTPATIENT
Start: 2022-07-22 | End: 2022-07-22 | Stop reason: SURG

## 2022-07-22 RX ORDER — DEXTROSE AND SODIUM CHLORIDE 5; .45 G/100ML; G/100ML
30 INJECTION, SOLUTION INTRAVENOUS CONTINUOUS PRN
Status: DISCONTINUED | OUTPATIENT
Start: 2022-07-22 | End: 2022-07-22 | Stop reason: HOSPADM

## 2022-07-22 RX ADMIN — PROPOFOL 20 MG: 10 INJECTION, EMULSION INTRAVENOUS at 14:56

## 2022-07-22 RX ADMIN — PROPOFOL 40 MG: 10 INJECTION, EMULSION INTRAVENOUS at 14:49

## 2022-07-22 RX ADMIN — PROPOFOL 20 MG: 10 INJECTION, EMULSION INTRAVENOUS at 15:08

## 2022-07-22 RX ADMIN — PROPOFOL 20 MG: 10 INJECTION, EMULSION INTRAVENOUS at 14:58

## 2022-07-22 RX ADMIN — PROPOFOL 20 MG: 10 INJECTION, EMULSION INTRAVENOUS at 15:03

## 2022-07-22 RX ADMIN — PROPOFOL 20 MG: 10 INJECTION, EMULSION INTRAVENOUS at 14:54

## 2022-07-22 RX ADMIN — PROPOFOL 80 MG: 10 INJECTION, EMULSION INTRAVENOUS at 14:46

## 2022-07-22 RX ADMIN — PROPOFOL 20 MG: 10 INJECTION, EMULSION INTRAVENOUS at 14:52

## 2022-07-22 RX ADMIN — DEXTROSE AND SODIUM CHLORIDE 30 ML/HR: 5; 450 INJECTION, SOLUTION INTRAVENOUS at 14:24

## 2022-07-22 NOTE — ANESTHESIA PREPROCEDURE EVALUATION
Anesthesia Evaluation     Patient summary reviewed and Nursing notes reviewed   NPO Solid Status: > 8 hours  NPO Liquid Status: > 2 hours           Airway   Mallampati: II  No difficulty expected  Dental    (+) upper dentures and lower dentures    Pulmonary - normal exam   (+) sleep apnea,   Cardiovascular - negative cardio ROS    Rhythm: regular  Rate: normal        Neuro/Psych  (+) CVA,    GI/Hepatic/Renal/Endo    (+)  GERD well controlled, GI bleeding lower ,     Musculoskeletal (-) negative ROS    Abdominal    Substance History      OB/GYN          Other - negative ROS                       Anesthesia Plan    ASA 3     MAC     intravenous induction     Anesthetic plan, risks, benefits, and alternatives have been provided, discussed and informed consent has been obtained with: patient.    Plan discussed with CRNA.        CODE STATUS:

## 2022-07-22 NOTE — ANESTHESIA POSTPROCEDURE EVALUATION
Patient: Ariana Doshi    Procedure Summary     Date: 07/22/22 Room / Location: [facility] ENDOSCOPY 1 / [facility] ENDOSCOPY    Anesthesia Start: 1441 Anesthesia Stop: 1515    Procedures:       ESOPHAGOGASTRODUODENOSCOPY (N/A )      COLONOSCOPY (N/A ) Diagnosis:       Diarrhea, unspecified type      Nausea      Other constipation      Hemorrhage of anus and rectum      (Diarrhea, unspecified type [R19.7])      (Nausea [R11.0])      (Other constipation [K59.09])      (Hemorrhage of anus and rectum [K62.5])    Surgeons: Chacha Clark MD Provider: Nilo Light CRNA    Anesthesia Type: MAC ASA Status: 3          Anesthesia Type: MAC    Vitals  No vitals data found for the desired time range.          Post Anesthesia Care and Evaluation    Patient location during evaluation: bedside  Patient participation: complete - patient participated  Level of consciousness: sleepy but conscious  Pain score: 0  Pain management: adequate    Airway patency: patent  Anesthetic complications: No anesthetic complications  PONV Status: none  Cardiovascular status: acceptable  Respiratory status: acceptable  Hydration status: acceptable    Comments: --------------------            07/22/22               1516     --------------------   BP:       111/63     Pulse:     71          Resp:                Temp:               --------------------

## 2022-07-22 NOTE — H&P
Chief Complaint   Patient presents with   • Abdominal Pain       Hospital f/u             Subjective         HPI:   Ms. Doshi is a 71-year-old  female with past medical history of thyroid disorder, sleep apnea, CVA, GERD, hiatal hernia, and 122  surgeries including gastric bypass, CBD stent placement and removal 2 years ago presenting for evaluation for abdominal pain.  She has intermittent bouts of generalized abdominal pain associated with vomiting, diarrhea and rectal bleeding for past 3 to 4 years.  Denied weight loss.  Takes aspirin daily.  Takes Celebrex as needed.     Past Medical History:   Medical History        Past Medical History:   Diagnosis Date   • Disease of thyroid gland     • GERD (gastroesophageal reflux disease)     • Sleep apnea     • Stroke (HCC)              Surgical History         Past Surgical History:  Past Surgical History:   Procedure Laterality Date   • BACK SURGERY       • BREAST SURGERY       • CHOLECYSTECTOMY       • HYSTERECTOMY       • NECK SURGERY                Family History:  No family history on file.     Social History:   reports that she has never smoked. She has never used smokeless tobacco. She reports that she does not drink alcohol and does not use drugs.     Medications:           Prior to Admission medications    Medication Sig Start Date End Date Taking? Authorizing Provider   celecoxib (CeleBREX) 200 MG capsule Take 200 mg by mouth Daily. 6/7/13   Yes ProviderWiley MD   cetirizine (zyrTEC) 10 MG tablet Daily. 6/7/13   Yes ProviderWiley MD   clonazePAM (KlonoPIN) 0.5 MG tablet 0.5 mg Daily. 11/21/18   Yes Wiley Majano MD   DULoxetine (CYMBALTA) 60 MG capsule Take 60 mg by mouth 2 (Two) Times a Day. 6/7/13   Yes ProviderWiley MD   Omeprazole 20 MG tablet delayed-release 20 mg Daily. 6/7/13   Yes ProviderWiley MD   potassium chloride (KLOR-CON) 10 MEQ CR tablet 10 mEq 2 (Two) Times a Day. 8/29/14   Yes Melecio  MD Wiley   aspirin 81 MG EC tablet Take 81 mg by mouth Daily.       Wiley Majano MD   dicyclomine (BENTYL) 20 MG tablet Take 1 tablet by mouth Every 6 (Six) Hours for 30 days. 7/19/22 8/18/22   Chacha Clark MD   diphenhydrAMINE (BENADRYL) 25 mg capsule Take 25 mg by mouth Daily.       Wiley Majano MD   levothyroxine (SYNTHROID, LEVOTHROID) 50 MCG tablet Take 50 mcg by mouth Daily.       Wiley Majano MD   Prenatal Multivit-Min-Fe-FA (Prenatal/Iron) tablet Take 1 tablet by mouth Daily.       Wiley Majano MD   promethazine (PHENERGAN) 25 MG suppository Insert 25 mg into the rectum.       Wiley Majano MD   rizatriptan MLT (MAXALT-MLT) 10 MG disintegrating tablet 10 mg As Needed. 5/25/22     Wiley Majano MD   traZODone (DESYREL) 150 MG tablet Take 150 mg by mouth every night at bedtime. 5/25/22     Wiley Majano MD         Allergies:  Lansoprazole, Propoxyphene, Tape, Valium [diazepam], and Versed [midazolam]     ROS:    Review of Systems   Constitutional: Negative for chills, fatigue, fever and unexpected weight change.   HENT: Negative for congestion, ear discharge, hearing loss, nosebleeds and sore throat.    Eyes: Negative for pain, discharge and redness.   Respiratory: Negative for cough, chest tightness, shortness of breath and wheezing.    Cardiovascular: Negative for chest pain and palpitations.   Gastrointestinal: Positive for abdominal pain, blood in stool, diarrhea, nausea and vomiting. Negative for abdominal distention and constipation.   Endocrine: Negative for cold intolerance, polydipsia, polyphagia and polyuria.   Genitourinary: Negative for dysuria, flank pain, frequency, hematuria and urgency.   Musculoskeletal: Negative for arthralgias, back pain, joint swelling and myalgias.   Skin: Negative for color change, pallor and rash.   Neurological: Negative for tremors, seizures, syncope, weakness and headaches.   Hematological:  Negative for adenopathy. Does not bruise/bleed easily.   Psychiatric/Behavioral: Negative for behavioral problems, confusion, dysphoric mood, hallucinations and suicidal ideas. The patient is not nervous/anxious.             Objective         Weight 58.2 kg (128 lb 3.2 oz).     Physical Exam  Constitutional:       Appearance: She is well-developed.   HENT:      Head: Normocephalic and atraumatic.   Eyes:      Conjunctiva/sclera: Conjunctivae normal.      Pupils: Pupils are equal, round, and reactive to light.   Neck:      Thyroid: No thyromegaly.   Cardiovascular:      Rate and Rhythm: Normal rate and regular rhythm.      Heart sounds: Normal heart sounds. No murmur heard.  Pulmonary:      Effort: Pulmonary effort is normal.      Breath sounds: Normal breath sounds. No wheezing.   Abdominal:      General: Bowel sounds are normal. There is no distension.      Palpations: Abdomen is soft. There is no mass.      Tenderness: There is no abdominal tenderness.      Hernia: No hernia is present.   Genitourinary:     Comments: No lesions noted  Musculoskeletal:         General: No tenderness. Normal range of motion.      Cervical back: Normal range of motion and neck supple.   Lymphadenopathy:      Cervical: No cervical adenopathy.   Skin:     General: Skin is warm and dry.      Findings: No rash.   Neurological:      Mental Status: She is alert and oriented to person, place, and time.      Cranial Nerves: No cranial nerve deficit.   Psychiatric:         Thought Content: Thought content normal.         Extremities: No edema, cyanosis or clubbing.           Assessment & Plan      1.  Abdominal pain with nausea and vomiting rule out peptic ulcer disease, gastritis and pancreaticobiliary pathology.  Continue Prilosec.  Proceed with EGD for further evaluation.  Discontinue NSAIDs.  2.  Abdominal pain with diarrhea rule out IBD.  Add Bentyl.  Proceed with colonoscopy for further evaluation.  3.  History of CBD stent placement  and removal, obtain records  4.  History of gastric bypass  Diagnoses and all orders for this visit:     1. Diarrhea, unspecified type (Primary)  -     Case Request; Standing  -     Case Request     2. Nausea  -     Case Request; Standing  -     Case Request     3. Other constipation  -     Case Request; Standing  -     Case Request     4. Hemorrhage of anus and rectum  -     Case Request; Standing  -     Case Request     Other orders  -     Follow Anesthesia Guidelines / Standing Orders; Future  -     Obtain Informed Consent; Future  -     dicyclomine (BENTYL) 20 MG tablet; Take 1 tablet by mouth Every 6 (Six) Hours for 30 days.  Dispense: 120 tablet; Refill: 5  -     polyethylene glycol (GaviLyte-C) 240 g solution; Take 4,000 mL by mouth 1 (One) Time for 1 dose.  Dispense: 4000 mL; Refill: 0           ESOPHAGOGASTRODUODENOSCOPY (N/A), COLONOSCOPY (N/A)       Diagnosis Plan   1. Diarrhea, unspecified type  Case Request     Case Request   2. Nausea  Case Request     Case Request   3. Other constipation  Case Request     Case Request   4. Hemorrhage of anus and rectum  Case Request     Case Request         Anticipated Surgical Procedure:        Orders Placed This Encounter   Procedures   • Follow Anesthesia Guidelines / Standing Orders       Standing Status:   Future   • Obtain Informed Consent       Standing Status:   Future       Order Specific Question:   Informed Consent Given For       Answer:   egd and colonoscopy         The risks, benefits, and alternatives of this procedure have been discussed with the patient or the responsible party- the patient understands and agrees to proceed.

## 2022-07-26 ENCOUNTER — APPOINTMENT (OUTPATIENT)
Dept: ULTRASOUND IMAGING | Facility: HOSPITAL | Age: 72
End: 2022-07-26

## 2022-07-26 LAB — REF LAB TEST METHOD: NORMAL

## 2022-07-28 ENCOUNTER — HOSPITAL ENCOUNTER (OUTPATIENT)
Dept: ULTRASOUND IMAGING | Facility: HOSPITAL | Age: 72
Discharge: HOME | End: 2022-07-28
Admitting: INTERNAL MEDICINE

## 2022-07-28 DIAGNOSIS — R11.0 NAUSEA: ICD-10-CM

## 2022-07-28 PROCEDURE — 76705 ECHO EXAM OF ABDOMEN: CPT

## 2022-08-04 ENCOUNTER — OFFICE VISIT (OUTPATIENT)
Dept: GASTROENTEROLOGY | Facility: CLINIC | Age: 72
End: 2022-08-04

## 2022-08-04 VITALS
HEIGHT: 60 IN | DIASTOLIC BLOOD PRESSURE: 89 MMHG | HEART RATE: 75 BPM | SYSTOLIC BLOOD PRESSURE: 174 MMHG | BODY MASS INDEX: 25.13 KG/M2 | WEIGHT: 128 LBS

## 2022-08-04 DIAGNOSIS — R11.0 NAUSEA: ICD-10-CM

## 2022-08-04 DIAGNOSIS — R10.10 PAIN OF UPPER ABDOMEN: Primary | ICD-10-CM

## 2022-08-04 PROCEDURE — 99214 OFFICE O/P EST MOD 30 MIN: CPT | Performed by: INTERNAL MEDICINE

## 2022-08-04 RX ORDER — LISINOPRIL 2.5 MG/1
2.5 TABLET ORAL DAILY
COMMUNITY
Start: 2022-07-20

## 2022-08-04 RX ORDER — DEXTROSE AND SODIUM CHLORIDE 5; .45 G/100ML; G/100ML
30 INJECTION, SOLUTION INTRAVENOUS CONTINUOUS PRN
Status: CANCELLED | OUTPATIENT
Start: 2022-08-16

## 2022-08-12 RX ORDER — MV-MIN/FA/VIT K/LUTEIN/ZEAXANT 200MCG-5MG
1 CAPSULE ORAL 2 TIMES DAILY
COMMUNITY

## 2022-08-16 ENCOUNTER — ANESTHESIA EVENT (OUTPATIENT)
Dept: GASTROENTEROLOGY | Facility: HOSPITAL | Age: 72
End: 2022-08-16

## 2022-08-16 ENCOUNTER — ANESTHESIA (OUTPATIENT)
Dept: GASTROENTEROLOGY | Facility: HOSPITAL | Age: 72
End: 2022-08-16

## 2022-08-16 ENCOUNTER — APPOINTMENT (OUTPATIENT)
Dept: GENERAL RADIOLOGY | Facility: HOSPITAL | Age: 72
End: 2022-08-16

## 2022-08-16 ENCOUNTER — HOSPITAL ENCOUNTER (OUTPATIENT)
Facility: HOSPITAL | Age: 72
Setting detail: HOSPITAL OUTPATIENT SURGERY
Discharge: HOME | End: 2022-08-16
Attending: INTERNAL MEDICINE | Admitting: INTERNAL MEDICINE

## 2022-08-16 VITALS
OXYGEN SATURATION: 95 % | RESPIRATION RATE: 16 BRPM | SYSTOLIC BLOOD PRESSURE: 117 MMHG | TEMPERATURE: 97 F | HEIGHT: 60 IN | WEIGHT: 128.09 LBS | HEART RATE: 74 BPM | DIASTOLIC BLOOD PRESSURE: 68 MMHG | BODY MASS INDEX: 25.15 KG/M2

## 2022-08-16 DIAGNOSIS — R10.10 PAIN OF UPPER ABDOMEN: ICD-10-CM

## 2022-08-16 DIAGNOSIS — R11.0 NAUSEA: ICD-10-CM

## 2022-08-16 LAB — GLUCOSE BLDC GLUCOMTR-MCNC: 197 MG/DL (ref 70–130)

## 2022-08-16 PROCEDURE — 25010000002 MIDAZOLAM PER 1 MG

## 2022-08-16 PROCEDURE — 82962 GLUCOSE BLOOD TEST: CPT

## 2022-08-16 PROCEDURE — 25010000002 SUCCINYLCHOLINE PER 20 MG

## 2022-08-16 PROCEDURE — 25010000002 ONDANSETRON PER 1 MG

## 2022-08-16 PROCEDURE — 43235 EGD DIAGNOSTIC BRUSH WASH: CPT | Performed by: INTERNAL MEDICINE

## 2022-08-16 PROCEDURE — 25010000002 FENTANYL CITRATE (PF) 50 MCG/ML SOLUTION

## 2022-08-16 PROCEDURE — 25010000002 PROPOFOL 10 MG/ML EMULSION

## 2022-08-16 RX ORDER — DIPHENHYDRAMINE HYDROCHLORIDE 50 MG/ML
12.5 INJECTION INTRAMUSCULAR; INTRAVENOUS
Status: DISCONTINUED | OUTPATIENT
Start: 2022-08-16 | End: 2022-08-16 | Stop reason: HOSPADM

## 2022-08-16 RX ORDER — LIDOCAINE HYDROCHLORIDE 20 MG/ML
INJECTION, SOLUTION INFILTRATION; PERINEURAL AS NEEDED
Status: DISCONTINUED | OUTPATIENT
Start: 2022-08-16 | End: 2022-08-16 | Stop reason: SURG

## 2022-08-16 RX ORDER — ONDANSETRON 2 MG/ML
4 INJECTION INTRAMUSCULAR; INTRAVENOUS ONCE AS NEEDED
Status: DISCONTINUED | OUTPATIENT
Start: 2022-08-16 | End: 2022-08-16 | Stop reason: HOSPADM

## 2022-08-16 RX ORDER — NALOXONE HCL 0.4 MG/ML
0.4 VIAL (ML) INJECTION AS NEEDED
Status: DISCONTINUED | OUTPATIENT
Start: 2022-08-16 | End: 2022-08-16 | Stop reason: HOSPADM

## 2022-08-16 RX ORDER — FENTANYL CITRATE 50 UG/ML
INJECTION, SOLUTION INTRAMUSCULAR; INTRAVENOUS AS NEEDED
Status: DISCONTINUED | OUTPATIENT
Start: 2022-08-16 | End: 2022-08-16 | Stop reason: SURG

## 2022-08-16 RX ORDER — FLUMAZENIL 0.1 MG/ML
0.2 INJECTION INTRAVENOUS AS NEEDED
Status: DISCONTINUED | OUTPATIENT
Start: 2022-08-16 | End: 2022-08-16 | Stop reason: HOSPADM

## 2022-08-16 RX ORDER — MIDAZOLAM HYDROCHLORIDE 1 MG/ML
INJECTION INTRAMUSCULAR; INTRAVENOUS AS NEEDED
Status: DISCONTINUED | OUTPATIENT
Start: 2022-08-16 | End: 2022-08-16 | Stop reason: SURG

## 2022-08-16 RX ORDER — PROMETHAZINE HYDROCHLORIDE 25 MG/1
25 SUPPOSITORY RECTAL ONCE AS NEEDED
Status: DISCONTINUED | OUTPATIENT
Start: 2022-08-16 | End: 2022-08-16 | Stop reason: HOSPADM

## 2022-08-16 RX ORDER — ONDANSETRON 2 MG/ML
INJECTION INTRAMUSCULAR; INTRAVENOUS AS NEEDED
Status: DISCONTINUED | OUTPATIENT
Start: 2022-08-16 | End: 2022-08-16 | Stop reason: SURG

## 2022-08-16 RX ORDER — PROMETHAZINE HYDROCHLORIDE 25 MG/1
25 TABLET ORAL ONCE AS NEEDED
Status: DISCONTINUED | OUTPATIENT
Start: 2022-08-16 | End: 2022-08-16 | Stop reason: HOSPADM

## 2022-08-16 RX ORDER — SUCCINYLCHOLINE CHLORIDE 20 MG/ML
INJECTION INTRAMUSCULAR; INTRAVENOUS AS NEEDED
Status: DISCONTINUED | OUTPATIENT
Start: 2022-08-16 | End: 2022-08-16 | Stop reason: SURG

## 2022-08-16 RX ORDER — EPHEDRINE SULFATE 50 MG/ML
5 INJECTION, SOLUTION INTRAVENOUS ONCE AS NEEDED
Status: DISCONTINUED | OUTPATIENT
Start: 2022-08-16 | End: 2022-08-16 | Stop reason: HOSPADM

## 2022-08-16 RX ORDER — ACETAMINOPHEN 650 MG/1
650 SUPPOSITORY RECTAL ONCE AS NEEDED
Status: DISCONTINUED | OUTPATIENT
Start: 2022-08-16 | End: 2022-08-16 | Stop reason: HOSPADM

## 2022-08-16 RX ORDER — ACETAMINOPHEN 325 MG/1
650 TABLET ORAL ONCE AS NEEDED
Status: DISCONTINUED | OUTPATIENT
Start: 2022-08-16 | End: 2022-08-16 | Stop reason: HOSPADM

## 2022-08-16 RX ORDER — DEXTROSE AND SODIUM CHLORIDE 5; .45 G/100ML; G/100ML
30 INJECTION, SOLUTION INTRAVENOUS CONTINUOUS PRN
Status: DISCONTINUED | OUTPATIENT
Start: 2022-08-16 | End: 2022-08-16 | Stop reason: HOSPADM

## 2022-08-16 RX ORDER — PROPOFOL 10 MG/ML
VIAL (ML) INTRAVENOUS AS NEEDED
Status: DISCONTINUED | OUTPATIENT
Start: 2022-08-16 | End: 2022-08-16 | Stop reason: SURG

## 2022-08-16 RX ADMIN — DEXTROSE AND SODIUM CHLORIDE 30 ML/HR: 5; 450 INJECTION, SOLUTION INTRAVENOUS at 15:36

## 2022-08-16 RX ADMIN — ONDANSETRON 4 MG: 2 INJECTION INTRAMUSCULAR; INTRAVENOUS at 16:47

## 2022-08-16 RX ADMIN — MIDAZOLAM HYDROCHLORIDE 1 MG: 1 INJECTION, SOLUTION INTRAMUSCULAR; INTRAVENOUS at 16:18

## 2022-08-16 RX ADMIN — FENTANYL CITRATE 100 MCG: 50 INJECTION INTRAMUSCULAR; INTRAVENOUS at 16:18

## 2022-08-16 RX ADMIN — SUCCINYLCHOLINE CHLORIDE 140 MG: 20 INJECTION, SOLUTION INTRAMUSCULAR; INTRAVENOUS at 16:21

## 2022-08-16 RX ADMIN — LIDOCAINE HYDROCHLORIDE 50 MG: 20 INJECTION, SOLUTION INFILTRATION; PERINEURAL at 16:20

## 2022-08-16 RX ADMIN — PROPOFOL 100 MG: 10 INJECTION, EMULSION INTRAVENOUS at 16:20

## 2022-08-16 NOTE — ANESTHESIA PREPROCEDURE EVALUATION
Anesthesia Evaluation     Patient summary reviewed and Nursing notes reviewed   NPO Solid Status: > 8 hours  NPO Liquid Status: > 2 hours           Airway   Mallampati: II  No difficulty expected  Dental    (+) upper dentures and lower dentures    Pulmonary - normal exam   (+) sleep apnea,   Cardiovascular - negative cardio ROS    Rhythm: regular  Rate: normal        Neuro/Psych  (+) CVA,    GI/Hepatic/Renal/Endo    (+)  GERD well controlled, GI bleeding lower , diabetes mellitus type 2 well controlled,     Musculoskeletal (-) negative ROS    Abdominal  - normal exam   Substance History      OB/GYN          Other      history of cancer remission                      Anesthesia Plan    ASA 3     general     intravenous induction     Anesthetic plan, risks, benefits, and alternatives have been provided, discussed and informed consent has been obtained with: patient.    Plan discussed with CRNA.        CODE STATUS:

## 2022-08-16 NOTE — ANESTHESIA PROCEDURE NOTES
Airway  Urgency: elective    Date/Time: 8/16/2022 4:23 PM  Airway not difficult    General Information and Staff    Patient location during procedure: OR  CRNA/CAA: Lukas Ugalde CRNA    Indications and Patient Condition  Indications for airway management: airway protection    Preoxygenated: yes  MILS maintained throughout  Mask difficulty assessment: 1 - vent by mask    Final Airway Details  Final airway type: endotracheal airway      Successful airway: ETT  Cuffed: yes   Successful intubation technique: direct laryngoscopy  Facilitating devices/methods: intubating stylet  Endotracheal tube insertion site: oral  Blade: Mcintosh  Blade size: 3  ETT size (mm): 7.5  Cormack-Lehane Classification: grade I - full view of glottis  Placement verified by: chest auscultation and capnometry   Cuff volume (mL): 7  Measured from: lips  ETT/EBT  to lips (cm): 22  Number of attempts at approach: 1  Assessment: lips, teeth, and gum same as pre-op and atraumatic intubation

## 2022-08-16 NOTE — ANESTHESIA POSTPROCEDURE EVALUATION
Patient: Ariana Doshi    Procedure Summary     Date: 08/16/22 Room / Location: [facility] ENDOSCOPY 2 / [facility] ENDOSCOPY    Anesthesia Start: 1618 Anesthesia Stop: 1700    Procedure: ENDOSCOPIC RETROGRADE CHOLANGIOPANCREATOGRAPHY (N/A ) Diagnosis:       Pain of upper abdomen      Nausea      (Pain of upper abdomen [R10.10])      (Nausea [R11.0])    Surgeons: Chacha Clark MD Provider: Lukas Ugalde CRNA    Anesthesia Type: general ASA Status: 3          Anesthesia Type: general    Vitals  No vitals data found for the desired time range.          Post Anesthesia Care and Evaluation    Patient location during evaluation: PACU  Patient participation: complete - patient cannot participate  Level of consciousness: sleepy but conscious  Pain score: 0  Pain management: adequate    Airway patency: patent  Anesthetic complications: No anesthetic complications  PONV Status: none  Cardiovascular status: acceptable  Respiratory status: acceptable and room air  Hydration status: acceptable  No anesthesia care post op

## 2022-08-19 DIAGNOSIS — R10.10 PAIN OF UPPER ABDOMEN: Primary | ICD-10-CM

## 2022-08-21 NOTE — PROGRESS NOTES
Chief Complaint   Patient presents with   • Follow-up     Endo follow up done on 7-22-22       Subjective    Ariana Doshi is a 71 y.o. female.    History of Present Illness  Patient presented to GI clinic for follow-up visit today.  Has continued symptoms with abdominal pain and constipation.  Also has leg weakness.  EGD was consistent with hiatal hernia, esophagitis and gastritis.  Retained stent from previous ERCP was found. Path was consistent with reactive gastropathy.  Colonoscopy was consistent with diarrhea and hemorrhoids.       The following portions of the patient's history were reviewed and updated as appropriate:   Past Medical History:   Diagnosis Date   • Breast cancer (HCC)    • Diabetes mellitus (HCC)     diet controlled   • Disease of thyroid gland    • GERD (gastroesophageal reflux disease)    • History of transfusion    • MI (myocardial infarction) (HCC)    • Sleep apnea      Past Surgical History:   Procedure Laterality Date   • BACK SURGERY      multiple   • CHOLECYSTECTOMY     • COLONOSCOPY N/A 07/22/2022    Procedure: COLONOSCOPY;  Surgeon: Chacha Clark MD;  Location: [facility] ENDOSCOPY;  Service: Gastroenterology;  Laterality: N/A;   • DILATATION AND CURETTAGE      multiple   • ENDOSCOPY N/A 07/22/2022    Procedure: ESOPHAGOGASTRODUODENOSCOPY;  Surgeon: Chacha Clark MD;  Location: [facility] ENDOSCOPY;  Service: Gastroenterology;  Laterality: N/A;   • GASTRIC BYPASS     • HYSTERECTOMY     • KNEE SURGERY Bilateral     multiple   • MASTECTOMY Bilateral    • NECK SURGERY      multiple   • RECTOCELE REPAIR      multiple     History reviewed. No pertinent family history.  OB History    No obstetric history on file.       Prior to Admission medications    Medication Sig Start Date End Date Taking? Authorizing Provider   aspirin 81 MG EC tablet Take 81 mg by mouth Daily.   Yes Provider, MD Wiley   celecoxib (CeleBREX) 200 MG capsule Take 200 mg by mouth Daily. 6/7/13  Yes Provider,  MD Wiley   cetirizine (zyrTEC) 10 MG tablet Take 10 mg by mouth Daily. 6/7/13  Yes Wiley Majano MD   clonazePAM (KlonoPIN) 0.5 MG tablet Take 0.5 mg by mouth Every Night. 11/21/18  Yes Wiley Majano MD   diphenhydrAMINE (BENADRYL) 25 mg capsule Take 25 mg by mouth Every Night.   Yes Wiley Majano MD   DULoxetine (CYMBALTA) 60 MG capsule Take 60 mg by mouth 2 (Two) Times a Day. 6/7/13  Yes Wiley Majano MD   levothyroxine (SYNTHROID, LEVOTHROID) 50 MCG tablet Take 50 mcg by mouth Daily.   Yes Wiley Majano MD   lisinopril (PRINIVIL,ZESTRIL) 2.5 MG tablet Take 2.5 mg by mouth Daily. 7/20/22  Yes Wiley Majano MD   Omeprazole 20 MG tablet delayed-release Take 20 mg by mouth Daily. 6/7/13  Yes Wiley Majano MD   potassium chloride 10 MEQ CR tablet Take 10 mEq by mouth 2 (Two) Times a Day. 8/29/14  Yes Wiley Majano MD   Prenatal Multivit-Min-Fe-FA (Prenatal/Iron) tablet Take 1 tablet by mouth Daily.   Yes Wiley Majano MD   rizatriptan MLT (MAXALT-MLT) 10 MG disintegrating tablet Place 10 mg on the tongue As Needed. 5/25/22  Yes Wiley Majano MD   traZODone (DESYREL) 150 MG tablet Take 150 mg by mouth every night at bedtime. 5/25/22  Yes Wiley Majano MD   linaclotide (LINZESS) 290 MCG capsule capsule Take 1 capsule by mouth Every Morning Before Breakfast. 8/4/22   Chacha Clark MD   Multiple Vitamins-Minerals (PreserVision AREDS 2+Multi Vit) capsule Take 1 capsule by mouth 2 (Two) Times a Day.    Wiley Majano MD   vitamin D3 125 MCG (5000 UT) capsule capsule Take 5,000 Units by mouth Daily.    Wiley Majano MD     Allergies   Allergen Reactions   • Lansoprazole Hives   • Propoxyphene GI Intolerance   • Tape Rash   • Valium [Diazepam] Anxiety     Social History     Socioeconomic History   • Marital status:    Tobacco Use   • Smoking status: Never Smoker   • Smokeless tobacco: Never Used   Vaping  "Use   • Vaping Use: Never used   Substance and Sexual Activity   • Alcohol use: Never   • Drug use: Never   • Sexual activity: Defer       Review of Systems  Review of Systems   Constitutional: Negative for chills, fatigue, fever and unexpected weight change.   HENT: Negative for congestion, ear discharge, hearing loss, nosebleeds and sore throat.    Eyes: Negative for pain, discharge and redness.   Respiratory: Negative for cough, chest tightness, shortness of breath and wheezing.    Cardiovascular: Negative for chest pain and palpitations.   Gastrointestinal: Positive for abdominal pain and constipation. Negative for abdominal distention, blood in stool, diarrhea, nausea and vomiting.   Endocrine: Negative for cold intolerance, polydipsia, polyphagia and polyuria.   Genitourinary: Negative for dysuria, flank pain, frequency, hematuria and urgency.   Musculoskeletal: Negative for arthralgias, back pain, joint swelling and myalgias.   Skin: Negative for color change, pallor and rash.   Neurological: Positive for weakness. Negative for tremors, seizures, syncope and headaches.   Hematological: Negative for adenopathy. Does not bruise/bleed easily.   Psychiatric/Behavioral: Negative for behavioral problems, confusion, dysphoric mood, hallucinations and suicidal ideas. The patient is not nervous/anxious.         /89   Pulse 75   Ht 152.4 cm (60\")   Wt 58.1 kg (128 lb)   BMI 25.00 kg/m²     Objective    Physical Exam  Constitutional:       Appearance: She is well-developed.   HENT:      Head: Normocephalic and atraumatic.   Eyes:      Conjunctiva/sclera: Conjunctivae normal.      Pupils: Pupils are equal, round, and reactive to light.   Neck:      Thyroid: No thyromegaly.   Cardiovascular:      Rate and Rhythm: Normal rate and regular rhythm.      Heart sounds: Normal heart sounds. No murmur heard.  Pulmonary:      Effort: Pulmonary effort is normal.      Breath sounds: Normal breath sounds. No wheezing. "   Abdominal:      General: Bowel sounds are normal. There is no distension.      Palpations: Abdomen is soft. There is no mass.      Tenderness: There is no abdominal tenderness.      Hernia: No hernia is present.   Genitourinary:     Comments: No lesions noted  Musculoskeletal:         General: No tenderness. Normal range of motion.      Cervical back: Normal range of motion and neck supple.   Lymphadenopathy:      Cervical: No cervical adenopathy.   Skin:     General: Skin is warm and dry.      Findings: No rash.   Neurological:      Mental Status: She is alert and oriented to person, place, and time.      Cranial Nerves: No cranial nerve deficit.   Psychiatric:         Thought Content: Thought content normal.       Admission on 2022, Discharged on 2022   Component Date Value Ref Range Status   • Reference Lab Report 2022    Final                    Value:[facility]  [address] [place] [address]  Phone: [phone] or [phone]  Fax: [phone]  Spike Tracy M.D., Medical Director    PATIENT NAME                           LABORATORY NO.  [address]  CHARLOTTE REID                      [id number]  [id number]                         AGE              SEX  SSN           CLIENT REF #  [facility]           71      1950  F    [id number]   [id number]    [place]                       REQUESTING M.D.     ATTENDING M.D.     COPY TO[address]                 DIAMOND LIRIANO ROBBIN  [address]  DATE COLLECTED      DATE RECEIVED      DATE REPORTED  2022    DIAGNOSIS:  A.   STOMACH, BIOPSY:  Reactive gastropathy  B.   GE JUNCTION:  Reactive gastric and squamous mucosa  Negative for specialized Robin's mucosa  C.   COLON, BIOPSY, MUCOSA:  No significant histologic                           abnormality    JBS/sm    CLINICAL  "HISTORY:  Diarrhea, unspecified type, nausea, other constipation, hemorrhage of anus and  rectum    SPECIMENS RECEIVED:  A.  STOMACH, BIOPSY  B.  GE JUNCTION  C.  COLON, BIOPSY, MUCOSA    MICROSCOPIC DESCRIPTION:  Tissue blocks are prepared and slides are examined microscopically on all  specimens. See diagnosis for details.    Professional interpretation rendered by Nasim Richmond M.D. at [facility],  [employer], [address] [place] [address].    GROSS DESCRIPTION:  A.  Specimen is received in 1 formalin filled container labeled \"stomach  biopsy\" and consists of 3 portions of tan soft tissue measuring 0.4 x 0.4 x  0.2 cm in aggregate.  Submitted entirely in 1 cassette.  MTH  B.  Specimen is received in 1 formalin filled container labeled \"EG junction  biopsy\" and consists of 2 portions of tan soft tissue measuring 0.4 x 0.3 x  0.2 cm in aggregate.  Submitted entirely in 1 cassette.  C.  Specimen is received in 1 formalin filled container                           labeled \"colonic  mucosa biopsy\" and consists of 3 portions of tan soft tissue measuring 0.5  x 0.4 x 0.2 cm in aggregate.  Submitted entirely in 1 cassette.    REVIEWED, DIAGNOSED AND ELECTRONICALLY  SIGNED BY:    Nasim Richmond M.D.  CPT CODES:  88305x3       Assessment & Plan      1. Pain of upper abdomen    2. Nausea    1.  Abdominal pain, likely due to reactive gastropathy coupled with retained stent in the bile duct.  Continue PPI and Carafate.  Proceed with ERCP for reevaluation of the CBD and stent removal  2.  Abdominal pain with constipation, add probiotics and Linzess.  Add high-fiber diet.  3.  History of gastric bypass  4.  Diverticulosis, add high-fiber diet.      Orders placed during this encounter include:  Orders Placed This Encounter   Procedures   • Obtain Informed Consent     Standing Status:   Future     Order Specific Question:   Informed Consent Given For     Answer:   ERCP       ENDOSCOPIC RETROGRADE " CHOLANGIOPANCREATOGRAPHY (N/A)    Review and/or summary of lab tests, radiology, procedures, medications. Review and summary of old records and obtaining of history. The risks and benefits of my recommendations, as well as other treatment options were discussed with the patient and her family members today. Questions were answered.    No orders of the defined types were placed in this encounter.      Follow-up: Return in about 1 month (around 2022).               Results for orders placed or performed during the hospital encounter of 22   POC Glucose Once    Specimen: Blood   Result Value Ref Range    Glucose 197 (H) 70 - 130 mg/dL   Results for orders placed or performed during the hospital encounter of 22   TISSUE EXAM, [facility] (AVNI,COR,MAD)    Specimen: A: Stomach; Tissue    B: Esophagus; Tissue    C: Large Intestine; Tissue   Result Value Ref Range    Reference Lab Report       [facility]  [address] [place] [address]  Phone: [phone] or [phone]  Fax: [phone]  Spike Tracy M.D., Medical Director    PATIENT NAME                           LABORATORY NO.  [address]  CHARLOTTE REID                      [id number]  [id number]                         AGE              SEX  SSN           CLIENT REF #  [facility]           71      1950  F    [id number]   [id number]    [place]                       REQUESTING M.D.     ATTENDING M.D.     COPY TO.  [address] [place] [address]                 DIAMOND LIRIANO ROBBIN  [address]  DATE COLLECTED      DATE RECEIVED      DATE REPORTED  2022    DIAGNOSIS:  A.   STOMACH, BIOPSY:  Reactive gastropathy  B.   GE JUNCTION:  Reactive gastric and squamous mucosa  Negative for specialized Robin's mucosa  C.   COLON, BIOPSY, MUCOSA:  No significant histologic  abnormality    JBS/sm    CLINICAL  "HISTORY:  Diarrhea, unspecified type, nausea, other constipation, hemorrhage of anus and  rectum    SPECIMENS RECEIVED:  A.  STOMACH, BIOPSY  B.  GE JUNCTION  C.  COLON, BIOPSY, MUCOSA    MICROSCOPIC DESCRIPTION:  Tissue blocks are prepared and slides are examined microscopically on all  specimens. See diagnosis for details.    Professional interpretation rendered by Nasim Richmond M.D. at [facility], [address] [place] [address].    GROSS DESCRIPTION:  A.  Specimen is received in 1 formalin filled container labeled \"stomach  biopsy\" and consists of 3 portions of tan soft tissue measuring 0.4 x 0.4 x  0.2 cm in aggregate.  Submitted entirely in 1 cassette.  MTH  B.  Specimen is received in 1 formalin filled container labeled \"EG junction  biopsy\" and consists of 2 portions of tan soft tissue measuring 0.4 x 0.3 x  0.2 cm in aggregate.  Submitted entirely in 1 cassette.  C.  Specimen is received in 1 formalin filled container  labeled \"colonic  mucosa biopsy\" and consists of 3 portions of tan soft tissue measuring 0.5  x 0.4 x 0.2 cm in aggregate.  Submitted entirely in 1 cassette.    REVIEWED, DIAGNOSED AND ELECTRONICALLY  SIGNED BY:    Nasim Richmond M.D.  CPT CODES:  88305x3     Results for orders placed or performed during the hospital encounter of 07/12/22   COVID-19 and FLU A/B PCR - Swab, Nasopharynx    Specimen: Nasopharynx; Swab   Result Value Ref Range    COVID19 Not Detected Not Detected - Ref. Range    Influenza A PCR Not Detected Not Detected    Influenza B PCR Not Detected Not Detected   Urinalysis With Culture If Indicated - Urine, Clean Catch    Specimen: Urine, Clean Catch   Result Value Ref Range    Color, UA Yellow Yellow, Straw, Dark Yellow, Eleanor    Appearance, UA Clear Clear    pH, UA 7.5 5.0 - 9.0    Specific [place], UA 1.014 1.003 - 1.030    Glucose, UA Negative Negative    Ketones, UA 15 mg/dL (1+) (A) Negative    Bilirubin, UA Negative Negative    Blood, UA Negative " Negative    Protein, UA Negative Negative    Leuk Esterase, UA Negative Negative    Nitrite, UA Negative Negative    Urobilinogen, UA 1.0 E.U./dL 0.2 - 1.0 E.U./dL   CBC Auto Differential    Specimen: Blood   Result Value Ref Range    WBC 8.02 3.40 - 10.80 10*3/mm3    RBC 4.41 3.77 - 5.28 10*6/mm3    Hemoglobin 12.7 12.0 - 15.9 g/dL    Hematocrit 36.4 34.0 - 46.6 %    MCV 82.5 79.0 - 97.0 fL    MCH 28.8 26.6 - 33.0 pg    MCHC 34.9 31.5 - 35.7 g/dL    RDW 12.0 (L) 12.3 - 15.4 %    RDW-SD 35.9 (L) 37.0 - 54.0 fl    MPV 9.8 6.0 - 12.0 fL    Platelets 269 140 - 450 10*3/mm3    Neutrophil % 60.0 42.7 - 76.0 %    Lymphocyte % 30.0 19.6 - 45.3 %    Monocyte % 8.7 5.0 - 12.0 %    Eosinophil % 0.1 (L) 0.3 - 6.2 %    Basophil % 1.0 0.0 - 1.5 %    Immature Grans % 0.2 0.0 - 0.5 %    Neutrophils, Absolute 4.80 1.70 - 7.00 10*3/mm3    Lymphocytes, Absolute 2.41 0.70 - 3.10 10*3/mm3    Monocytes, Absolute 0.70 0.10 - 0.90 10*3/mm3    Eosinophils, Absolute 0.01 0.00 - 0.40 10*3/mm3    Basophils, Absolute 0.08 0.00 - 0.20 10*3/mm3    Immature Grans, Absolute 0.02 0.00 - 0.05 10*3/mm3    nRBC 0.0 0.0 - 0.2 /100 WBC   Troponin    Specimen: Blood   Result Value Ref Range    Troponin T <0.010 0.000 - 0.030 ng/mL   Lipase    Specimen: Blood   Result Value Ref Range    Lipase 43 13 - 60 U/L   Comprehensive Metabolic Panel    Specimen: Blood   Result Value Ref Range    Glucose 127 (H) 65 - 99 mg/dL    BUN 9 8 - 23 mg/dL    Creatinine 0.70 0.57 - 1.00 mg/dL    Sodium 137 136 - 145 mmol/L    Potassium 3.5 3.5 - 5.2 mmol/L    Chloride 104 98 - 107 mmol/L    CO2 20.0 (L) 22.0 - 29.0 mmol/L    Calcium 8.7 8.6 - 10.5 mg/dL    Total Protein 6.8 6.0 - 8.5 g/dL    Albumin 3.90 3.50 - 5.20 g/dL    ALT (SGPT) 52 (H) 1 - 33 U/L    AST (SGOT) 45 (H) 1 - 32 U/L    Alkaline Phosphatase 111 39 - 117 U/L    Total Bilirubin 1.4 (H) 0.0 - 1.2 mg/dL    Globulin 2.9 gm/dL    A/G Ratio 1.3 g/dL    BUN/Creatinine Ratio 12.9 7.0 - 25.0    Anion Gap 13.0 5.0 -  15.0 mmol/L    eGFR 92.6 >60.0 mL/min/1.73   ECG 12 Lead   Result Value Ref Range    QT Interval 418 ms    QTC Interval 431 ms   Results for orders placed or performed in visit on 01/14/14   Hemoglobin and hematocrit, blood    Specimen: Blood   Result Value Ref Range    Hemoglobin 12.3 11.9 - 15.5 g/dL    Hematocrit 37.7 35.6 - 45.5 %   Hemoglobin and hematocrit, blood    Specimen: Blood   Result Value Ref Range    Hemoglobin 12.2 11.9 - 15.5 g/dL    Hematocrit 37.9 35.6 - 45.5 %   Basic metabolic panel    Specimen: Blood   Result Value Ref Range    Glucose 102 74 - 106 mg/dL    BUN 9 8 - 23 mg/dL    Creatinine 0.56 0.50 - 0.90 mg/dL    Sodium 138 136 - 145 mmol/L    Potassium 3.6 3.5 - 5.0 mmol/L    Chloride 102 98 - 107 mmol/L    CO2 27 22 - 29 mmol/L    Calcium 8.4 (L) 8.8 - 10.2 mg/dL    eGFR >60 ml/min/1.732   Basic metabolic panel    Specimen: Blood   Result Value Ref Range    Glucose 148 (H) 74 - 106 mg/dL    BUN 8 8 - 23 mg/dL    Creatinine 0.60 0.50 - 0.90 mg/dL    Sodium 140 136 - 145 mmol/L    Potassium 3.7 3.5 - 5.0 mmol/L    Chloride 104 98 - 107 mmol/L    CO2 25 22 - 29 mmol/L    Calcium 8.4 (L) 8.6 - 10.2 mg/dL    eGFR >60 ml/min/1.732         This document has been electronically signed by Chacha Clark MD on August 20, 2022 20:02 CDT

## 2022-08-23 ENCOUNTER — LAB (OUTPATIENT)
Dept: LAB | Facility: HOSPITAL | Age: 72
End: 2022-08-23

## 2022-08-23 ENCOUNTER — OFFICE VISIT (OUTPATIENT)
Dept: GASTROENTEROLOGY | Facility: CLINIC | Age: 72
End: 2022-08-23

## 2022-08-23 VITALS
BODY MASS INDEX: 25.21 KG/M2 | HEIGHT: 60 IN | HEART RATE: 71 BPM | SYSTOLIC BLOOD PRESSURE: 128 MMHG | DIASTOLIC BLOOD PRESSURE: 72 MMHG | OXYGEN SATURATION: 95 % | WEIGHT: 128.4 LBS

## 2022-08-23 DIAGNOSIS — R10.10 PAIN OF UPPER ABDOMEN: Primary | ICD-10-CM

## 2022-08-23 LAB
ALBUMIN SERPL-MCNC: 3.8 G/DL (ref 3.5–5.2)
ALBUMIN/GLOB SERPL: 1.3 G/DL
ALP SERPL-CCNC: 117 U/L (ref 39–117)
ALT SERPL W P-5'-P-CCNC: 58 U/L (ref 1–33)
ANION GAP SERPL CALCULATED.3IONS-SCNC: 9.8 MMOL/L (ref 5–15)
AST SERPL-CCNC: 74 U/L (ref 1–32)
BILIRUB SERPL-MCNC: 0.7 MG/DL (ref 0–1.2)
BUN SERPL-MCNC: 13 MG/DL (ref 8–23)
BUN/CREAT SERPL: 17.8 (ref 7–25)
CALCIUM SPEC-SCNC: 8.7 MG/DL (ref 8.6–10.5)
CHLORIDE SERPL-SCNC: 104 MMOL/L (ref 98–107)
CO2 SERPL-SCNC: 26.2 MMOL/L (ref 22–29)
CREAT SERPL-MCNC: 0.73 MG/DL (ref 0.57–1)
EGFRCR SERPLBLD CKD-EPI 2021: 88 ML/MIN/1.73
GLOBULIN UR ELPH-MCNC: 2.9 GM/DL
GLUCOSE SERPL-MCNC: 90 MG/DL (ref 65–99)
POTASSIUM SERPL-SCNC: 4.5 MMOL/L (ref 3.5–5.2)
PROT SERPL-MCNC: 6.7 G/DL (ref 6–8.5)
SODIUM SERPL-SCNC: 140 MMOL/L (ref 136–145)

## 2022-08-23 PROCEDURE — 99214 OFFICE O/P EST MOD 30 MIN: CPT | Performed by: INTERNAL MEDICINE

## 2022-08-23 PROCEDURE — 36415 COLL VENOUS BLD VENIPUNCTURE: CPT | Performed by: INTERNAL MEDICINE

## 2022-08-23 PROCEDURE — 80053 COMPREHEN METABOLIC PANEL: CPT | Performed by: INTERNAL MEDICINE

## 2022-08-23 RX ORDER — BUSPIRONE HYDROCHLORIDE 7.5 MG/1
7.5 TABLET ORAL 3 TIMES DAILY
COMMUNITY
Start: 2022-07-19

## 2022-08-25 NOTE — PROGRESS NOTES
Chief Complaint   Patient presents with   • Follow-up     1 month follow up, pain in upper abdomen        Subjective    Ariana Doshi is a 71 y.o. female.    History of Present Illness  Patient presented to GI clinic for follow-up visit today.  Has continued symptoms with epigastric pain and nausea.  Has intermittent vomiting.  Bowel movements regular.  Weight is stable.  ERCP was incomplete due to retained food in the stomach.       The following portions of the patient's history were reviewed and updated as appropriate:   Past Medical History:   Diagnosis Date   • Breast cancer (HCC)    • Diabetes mellitus (HCC)     diet controlled   • Disease of thyroid gland    • GERD (gastroesophageal reflux disease)    • History of transfusion    • MI (myocardial infarction) (HCC)    • Sleep apnea      Past Surgical History:   Procedure Laterality Date   • BACK SURGERY      multiple   • CHOLECYSTECTOMY     • COLONOSCOPY N/A 07/22/2022    Procedure: COLONOSCOPY;  Surgeon: Chacha Clark MD;  Location: [facility] ENDOSCOPY;  Service: Gastroenterology;  Laterality: N/A;   • DILATATION AND CURETTAGE      multiple   • ENDOSCOPY N/A 07/22/2022    Procedure: ESOPHAGOGASTRODUODENOSCOPY;  Surgeon: Chacha Clark MD;  Location: [facility] ENDOSCOPY;  Service: Gastroenterology;  Laterality: N/A;   • GASTRIC BYPASS     • HYSTERECTOMY     • KNEE SURGERY Bilateral     multiple   • MASTECTOMY Bilateral    • NECK SURGERY      multiple   • RECTOCELE REPAIR      multiple     No family history on file.  OB History    No obstetric history on file.       Prior to Admission medications    Medication Sig Start Date End Date Taking? Authorizing Provider   aspirin 81 MG EC tablet Take 81 mg by mouth Daily.   Yes Wiley Majano MD   busPIRone (BUSPAR) 7.5 MG tablet Take 7.5 mg by mouth 2 (Two) Times a Day. 7/19/22  Yes Wiley Majano MD   celecoxib (CeleBREX) 200 MG capsule Take 200 mg by mouth Daily. 6/7/13  Yes Wiley Majano  MD   cetirizine (zyrTEC) 10 MG tablet Take 10 mg by mouth Daily. 6/7/13  Yes Wiley Majano MD   clonazePAM (KlonoPIN) 0.5 MG tablet Take 0.5 mg by mouth Every Night. 11/21/18  Yes Wiley Majano MD   diphenhydrAMINE (BENADRYL) 25 mg capsule Take 25 mg by mouth Every Night.   Yes Wiley Majano MD   DULoxetine (CYMBALTA) 60 MG capsule Take 60 mg by mouth 2 (Two) Times a Day. 6/7/13  Yes Wiley Majano MD   levothyroxine (SYNTHROID, LEVOTHROID) 50 MCG tablet Take 50 mcg by mouth Daily.   Yes Wiley Majano MD   linaclotide (LINZESS) 290 MCG capsule capsule Take 1 capsule by mouth Every Morning Before Breakfast. 8/4/22  Yes Chacha Clark MD   lisinopril (PRINIVIL,ZESTRIL) 2.5 MG tablet Take 2.5 mg by mouth Daily. 7/20/22  Yes Wiley Majano MD   Multiple Vitamins-Minerals (PreserVision AREDS 2+Multi Vit) capsule Take 1 capsule by mouth 2 (Two) Times a Day.   Yes Wiley Majano MD   Omeprazole 20 MG tablet delayed-release Take 20 mg by mouth Daily. 6/7/13  Yes Wiley Majano MD   potassium chloride 10 MEQ CR tablet Take 10 mEq by mouth 2 (Two) Times a Day. 8/29/14  Yes Wiley Majano MD   Prenatal Multivit-Min-Fe-FA (Prenatal/Iron) tablet Take 1 tablet by mouth Daily.   Yes Wiley Majano MD   traZODone (DESYREL) 150 MG tablet Take 150 mg by mouth every night at bedtime. 5/25/22  Yes Wiley Majano MD   vitamin D3 125 MCG (5000 UT) capsule capsule Take 5,000 Units by mouth Daily.   Yes Wiley Majano MD   rizatriptan MLT (MAXALT-MLT) 10 MG disintegrating tablet Place 10 mg on the tongue As Needed. 5/25/22   Wiley Majano MD     Allergies   Allergen Reactions   • Lansoprazole Hives   • Propoxyphene GI Intolerance   • Tape Rash   • Valium [Diazepam] Anxiety     Social History     Socioeconomic History   • Marital status:    Tobacco Use   • Smoking status: Never Smoker   • Smokeless tobacco: Never Used   Vaping Use   •  "Vaping Use: Never used   Substance and Sexual Activity   • Alcohol use: Never   • Drug use: Never   • Sexual activity: Defer       Review of Systems  Review of Systems   Constitutional: Negative for chills, fatigue, fever and unexpected weight change.   HENT: Negative for congestion, ear discharge, hearing loss, nosebleeds and sore throat.    Eyes: Negative for pain, discharge and redness.   Respiratory: Negative for cough, chest tightness, shortness of breath and wheezing.    Cardiovascular: Negative for chest pain and palpitations.   Gastrointestinal: Positive for abdominal pain, nausea and vomiting. Negative for abdominal distention, blood in stool, constipation and diarrhea.   Endocrine: Negative for cold intolerance, polydipsia, polyphagia and polyuria.   Genitourinary: Negative for dysuria, flank pain, frequency, hematuria and urgency.   Musculoskeletal: Negative for arthralgias, back pain, joint swelling and myalgias.   Skin: Negative for color change, pallor and rash.   Neurological: Negative for tremors, seizures, syncope, weakness and headaches.   Hematological: Negative for adenopathy. Does not bruise/bleed easily.   Psychiatric/Behavioral: Negative for behavioral problems, confusion, dysphoric mood, hallucinations and suicidal ideas. The patient is not nervous/anxious.         /72 (BP Location: Right arm, Patient Position: Sitting)   Pulse 71   Ht 152.4 cm (60\")   Wt 58.2 kg (128 lb 6.4 oz)   SpO2 95%   BMI 25.08 kg/m²     Objective    Physical Exam  Constitutional:       Appearance: She is well-developed.   HENT:      Head: Normocephalic and atraumatic.   Eyes:      Conjunctiva/sclera: Conjunctivae normal.      Pupils: Pupils are equal, round, and reactive to light.   Neck:      Thyroid: No thyromegaly.   Cardiovascular:      Rate and Rhythm: Normal rate and regular rhythm.      Heart sounds: Normal heart sounds. No murmur heard.  Pulmonary:      Effort: Pulmonary effort is normal.      " Breath sounds: Normal breath sounds. No wheezing.   Abdominal:      General: Bowel sounds are normal. There is no distension.      Palpations: Abdomen is soft. There is no mass.      Tenderness: There is no abdominal tenderness.      Hernia: No hernia is present.   Genitourinary:     Comments: No lesions noted  Musculoskeletal:         General: No tenderness. Normal range of motion.      Cervical back: Normal range of motion and neck supple.   Lymphadenopathy:      Cervical: No cervical adenopathy.   Skin:     General: Skin is warm and dry.      Findings: No rash.   Neurological:      Mental Status: She is alert and oriented to person, place, and time.      Cranial Nerves: No cranial nerve deficit.   Psychiatric:         Thought Content: Thought content normal.       Admission on 08/16/2022, Discharged on 08/16/2022   Component Date Value Ref Range Status   • Glucose 08/16/2022 197 (A) 70 - 130 mg/dL Final    : [id number] [employer] ID: [id number]     Assessment & Plan      1. Pain of upper abdomen    1.  Abdominal pain, likely due to reactive gastropathy and gastroparesis coupled with retained stent in the bile duct.  Continue PPI and Carafate.    Obtain LFTs and MRCP.  Obtain gastric emptying scan.  EGD with CBD stent removal if MRCP is unremarkable.  2.  Abdominal pain with constipation, well controlled.  Continue probiotics and Linzess.  Continue high-fiber diet.  3.  History of gastric bypass  4.  Diverticulosis, add high-fiber diet.       Orders placed during this encounter include:  Orders Placed This Encounter   Procedures   • NM Gastric Emptying     Standing Status:   Future     Standing Expiration Date:   8/23/2023   • Comprehensive Metabolic Panel     Order Specific Question:   Release to patient     Answer:   Routine Release       * Surgery not found *    Review and/or summary of lab tests, radiology, procedures, medications. Review and summary of old records and obtaining of  history. The risks and benefits of my recommendations, as well as other treatment options were discussed with the Patient and her family membertoday. Questions were answered.    No orders of the defined types were placed in this encounter.      Follow-up: Return in about 2 weeks (around 2022).               Results for orders placed or performed in visit on 22   Comprehensive Metabolic Panel    Specimen: Blood   Result Value Ref Range    Glucose 90 65 - 99 mg/dL    BUN 13 8 - 23 mg/dL    Creatinine 0.73 0.57 - 1.00 mg/dL    Sodium 140 136 - 145 mmol/L    Potassium 4.5 3.5 - 5.2 mmol/L    Chloride 104 98 - 107 mmol/L    CO2 26.2 22.0 - 29.0 mmol/L    Calcium 8.7 8.6 - 10.5 mg/dL    Total Protein 6.7 6.0 - 8.5 g/dL    Albumin 3.80 3.50 - 5.20 g/dL    ALT (SGPT) 58 (H) 1 - 33 U/L    AST (SGOT) 74 (H) 1 - 32 U/L    Alkaline Phosphatase 117 39 - 117 U/L    Total Bilirubin 0.7 0.0 - 1.2 mg/dL    Globulin 2.9 gm/dL    A/G Ratio 1.3 g/dL    BUN/Creatinine Ratio 17.8 7.0 - 25.0    Anion Gap 9.8 5.0 - 15.0 mmol/L    eGFR 88.0 >60.0 mL/min/1.73   Results for orders placed or performed during the hospital encounter of 22   POC Glucose Once    Specimen: Blood   Result Value Ref Range    Glucose 197 (H) 70 - 130 mg/dL   Results for orders placed or performed during the hospital encounter of 22   TISSUE EXAM, [facility] ([facility])    Specimen: A: Stomach; Tissue    B: Esophagus; Tissue    C: Large Intestine; Tissue   Result Value Ref Range    Reference Lab Report       [facility]  [address] [place] [address]  Phone: [phone] or [phone]  Fax: [phone]  Spike Tracy M.D., Medical Director    PATIENT NAME                           LABORATORY NO.  [address]  CHARL[address]TE REID                      [id number]  [id number]                         AGE              SEX  SSN           CLIENT REF #  [facility]           71      1950  F     [id number]   [id number]    Gobles                       REQUESTING M.D.     ATTENDING [facility].     COPY TO.  [address] [place] [address]                 DI[place] [address]             JANINE  DATE COLLECTED      DATE RECEIVED      DATE REPORTED  07/22/2022 07/25/2022 07/26/2022    DIAGNOSIS:  A.   STOMACH, BIOPSY:  Reactive gastropathy  B.   GE JUNCTION:  Reactive gastric and squamous mucosa  Negative for specialized Robin's mucosa  C.   COLON, BIOPSY, MUCOSA:  No significant histologic  abnormality    JBS/sm    CLINICAL HISTORY:  Diarrhea, unspecified type, nausea, other constipation, hemorrhage of anus and  rectum    SPECIMENS RECEIVED:  A.  STOMACH, BIOPSY  B.  GE JUNCTION  C.  COLON, BIOPSY, MUCOSA    MICROSCOPIC DESCRIPTION:  Tissue blocks are prepared and slides are examined microscopically on all  specimens. See diagnosis for details.    Professional interpretation rendered by Nasim Richmond M.D. at [facility],  [employer], [address] [place] [address] , [place] [address].    GROSS DESCRIPTION:  A.  Specimen is received in 1 formalin filled container labeled \"stomach  biopsy\" and consists of 3 portions of tan soft tissue measuring 0.4 x 0.4 x  0.2 cm in aggregate.  Submitted entirely in 1 cassette.  MTH  B.  Specimen is received in 1 formalin filled container labeled \"EG junction  biopsy\" and consists of 2 portions of tan soft tissue measuring 0.4 x 0.3 x  0.2 cm in aggregate.  Submitted entirely in 1 cassette.  C.  Specimen is received in 1 formalin filled container  labeled \"colonic  mucosa biopsy\" and consists of 3 portions of tan soft tissue measuring 0.5  x 0.4 x 0.2 cm in aggregate.  Submitted entirely in 1 cassette.    REVIEWED, DIAGNOSED AND ELECTRONICALLY  SIGNED BY:    Nasim Richmond M.D.  CPT CODES:  88305x3     Results for orders placed or performed during the hospital encounter of 07/12/22   COVID-19 and FLU A/B PCR - Swab, Nasopharynx    Specimen: " Nasopharynx; Swab   Result Value Ref Range    COVID19 Not Detected Not Detected - Ref. Range    Influenza A PCR Not Detected Not Detected    Influenza B PCR Not Detected Not Detected   Urinalysis With Culture If Indicated - Urine, Clean Catch    Specimen: Urine, Clean Catch   Result Value Ref Range    Color, UA Yellow Yellow, Straw, Dark Yellow, Eleanor    Appearance, UA Clear Clear    pH, UA 7.5 5.0 - 9.0    Specific [place], UA 1.014 1.003 - 1.030    Glucose, UA Negative Negative    Ketones, UA 15 mg/dL (1+) (A) Negative    Bilirubin, UA Negative Negative    Blood, UA Negative Negative    Protein, UA Negative Negative    Leuk Esterase, UA Negative Negative    Nitrite, UA Negative Negative    Urobilinogen, UA 1.0 E.U./dL 0.2 - 1.0 E.U./dL   CBC Auto Differential    Specimen: Blood   Result Value Ref Range    WBC 8.02 3.40 - 10.80 10*3/mm3    RBC 4.41 3.77 - 5.28 10*6/mm3    Hemoglobin 12.7 12.0 - 15.9 g/dL    Hematocrit 36.4 34.0 - 46.6 %    MCV 82.5 79.0 - 97.0 fL    MCH 28.8 26.6 - 33.0 pg    MCHC 34.9 31.5 - 35.7 g/dL    RDW 12.0 (L) 12.3 - 15.4 %    RDW-SD 35.9 (L) 37.0 - 54.0 fl    MPV 9.8 6.0 - 12.0 fL    Platelets 269 140 - 450 10*3/mm3    Neutrophil % 60.0 42.7 - 76.0 %    Lymphocyte % 30.0 19.6 - 45.3 %    Monocyte % 8.7 5.0 - 12.0 %    Eosinophil % 0.1 (L) 0.3 - 6.2 %    Basophil % 1.0 0.0 - 1.5 %    Immature Grans % 0.2 0.0 - 0.5 %    Neutrophils, Absolute 4.80 1.70 - 7.00 10*3/mm3    Lymphocytes, Absolute 2.41 0.70 - 3.10 10*3/mm3    Monocytes, Absolute 0.70 0.10 - 0.90 10*3/mm3    Eosinophils, Absolute 0.01 0.00 - 0.40 10*3/mm3    Basophils, Absolute 0.08 0.00 - 0.20 10*3/mm3    Immature Grans, Absolute 0.02 0.00 - 0.05 10*3/mm3    nRBC 0.0 0.0 - 0.2 /100 WBC   Troponin    Specimen: Blood   Result Value Ref Range    Troponin T <0.010 0.000 - 0.030 ng/mL   Lipase    Specimen: Blood   Result Value Ref Range    Lipase 43 13 - 60 U/L   Comprehensive Metabolic Panel    Specimen: Blood   Result Value Ref Range     Glucose 127 (H) 65 - 99 mg/dL    BUN 9 8 - 23 mg/dL    Creatinine 0.70 0.57 - 1.00 mg/dL    Sodium 137 136 - 145 mmol/L    Potassium 3.5 3.5 - 5.2 mmol/L    Chloride 104 98 - 107 mmol/L    CO2 20.0 (L) 22.0 - 29.0 mmol/L    Calcium 8.7 8.6 - 10.5 mg/dL    Total Protein 6.8 6.0 - 8.5 g/dL    Albumin 3.90 3.50 - 5.20 g/dL    ALT (SGPT) 52 (H) 1 - 33 U/L    AST (SGOT) 45 (H) 1 - 32 U/L    Alkaline Phosphatase 111 39 - 117 U/L    Total Bilirubin 1.4 (H) 0.0 - 1.2 mg/dL    Globulin 2.9 gm/dL    A/G Ratio 1.3 g/dL    BUN/Creatinine Ratio 12.9 7.0 - 25.0    Anion Gap 13.0 5.0 - 15.0 mmol/L    eGFR 92.6 >60.0 mL/min/1.73   ECG 12 Lead   Result Value Ref Range    QT Interval 418 ms    QTC Interval 431 ms   Results for orders placed or performed in visit on 01/14/14   Hemoglobin and hematocrit, blood    Specimen: Blood   Result Value Ref Range    Hemoglobin 12.3 11.9 - 15.5 g/dL    Hematocrit 37.7 35.6 - 45.5 %   Hemoglobin and hematocrit, blood    Specimen: Blood   Result Value Ref Range    Hemoglobin 12.2 11.9 - 15.5 g/dL    Hematocrit 37.9 35.6 - 45.5 %   Basic metabolic panel    Specimen: Blood   Result Value Ref Range    Glucose 102 74 - 106 mg/dL    BUN 9 8 - 23 mg/dL    Creatinine 0.56 0.50 - 0.90 mg/dL    Sodium 138 136 - 145 mmol/L    Potassium 3.6 3.5 - 5.0 mmol/L    Chloride 102 98 - 107 mmol/L    CO2 27 22 - 29 mmol/L    Calcium 8.4 (L) 8.8 - 10.2 mg/dL    eGFR >60 ml/min/1.732   Basic metabolic panel    Specimen: Blood   Result Value Ref Range    Glucose 148 (H) 74 - 106 mg/dL    BUN 8 8 - 23 mg/dL    Creatinine 0.60 0.50 - 0.90 mg/dL    Sodium 140 136 - 145 mmol/L    Potassium 3.7 3.5 - 5.0 mmol/L    Chloride 104 98 - 107 mmol/L    CO2 25 22 - 29 mmol/L    Calcium 8.4 (L) 8.6 - 10.2 mg/dL    eGFR >60 ml/min/1.732     *Note: Due to a large number of results and/or encounters for the requested time period, some results have not been displayed. A complete set of results can be found in Results Review.          This document has been electronically signed by Chacha Clark MD on August 24, 2022 20:13 CDT

## 2022-08-26 ENCOUNTER — HOSPITAL ENCOUNTER (OUTPATIENT)
Dept: MRI IMAGING | Facility: HOSPITAL | Age: 72
Discharge: HOME | End: 2022-08-26
Admitting: INTERNAL MEDICINE

## 2022-08-26 DIAGNOSIS — R10.10 PAIN OF UPPER ABDOMEN: ICD-10-CM

## 2022-08-26 PROCEDURE — 25010000002 GADOTERIDOL PER 1 ML: Performed by: INTERNAL MEDICINE

## 2022-08-26 PROCEDURE — A9579 GAD-BASE MR CONTRAST NOS,1ML: HCPCS | Performed by: INTERNAL MEDICINE

## 2022-08-26 PROCEDURE — 74183 MRI ABD W/O CNTR FLWD CNTR: CPT

## 2022-08-26 RX ADMIN — GADOTERIDOL 12 ML: 279.3 INJECTION, SOLUTION INTRAVENOUS at 14:37

## 2022-08-31 ENCOUNTER — HOSPITAL ENCOUNTER (OUTPATIENT)
Dept: NUCLEAR MEDICINE | Facility: HOSPITAL | Age: 72
Discharge: HOME | End: 2022-08-31

## 2022-08-31 DIAGNOSIS — R10.10 PAIN OF UPPER ABDOMEN: ICD-10-CM

## 2022-08-31 PROCEDURE — 0 TECHNETIUM SULFUR COLLOID: Performed by: INTERNAL MEDICINE

## 2022-08-31 PROCEDURE — A9541 TC99M SULFUR COLLOID: HCPCS | Performed by: INTERNAL MEDICINE

## 2022-08-31 PROCEDURE — 78264 GASTRIC EMPTYING IMG STUDY: CPT

## 2022-08-31 RX ADMIN — TECHNETIUM TC 99M SULFUR COLLOID 1 DOSE: KIT at 09:01

## 2022-09-27 ENCOUNTER — OFFICE VISIT (OUTPATIENT)
Dept: GASTROENTEROLOGY | Facility: CLINIC | Age: 72
End: 2022-09-27

## 2022-09-27 VITALS
SYSTOLIC BLOOD PRESSURE: 135 MMHG | HEIGHT: 60 IN | BODY MASS INDEX: 25.56 KG/M2 | WEIGHT: 130.2 LBS | DIASTOLIC BLOOD PRESSURE: 69 MMHG | HEART RATE: 67 BPM

## 2022-09-27 DIAGNOSIS — R10.10 PAIN OF UPPER ABDOMEN: Primary | ICD-10-CM

## 2022-09-27 PROCEDURE — 99214 OFFICE O/P EST MOD 30 MIN: CPT | Performed by: INTERNAL MEDICINE

## 2022-10-16 NOTE — PROGRESS NOTES
Chief Complaint   Patient presents with   • f/u after gastric emptying        Subjective    Ariana Doshi is a 72 y.o. female.    History of Present Illness  Patient presented to GI clinic for follow-up visit.  Feels some better currently.  Abdominal pain has improved but persistent.  Denied nausea or vomiting.  Comments regular.  Weight is stable.  Taking aspirin and Celebrex daily.  Gastric emptying scan was unremarkable.  LFTs are elevated.       The following portions of the patient's history were reviewed and updated as appropriate:   Past Medical History:   Diagnosis Date   • Breast cancer (HCC)    • Diabetes mellitus (HCC)     diet controlled   • Disease of thyroid gland    • GERD (gastroesophageal reflux disease)    • History of transfusion    • MI (myocardial infarction) (HCC)    • Sleep apnea      Past Surgical History:   Procedure Laterality Date   • BACK SURGERY      multiple   • CHOLECYSTECTOMY     • COLONOSCOPY N/A 07/22/2022    Procedure: COLONOSCOPY;  Surgeon: Chacha Clark MD;  Location: [facility] ENDOSCOPY;  Service: Gastroenterology;  Laterality: N/A;   • DILATATION AND CURETTAGE      multiple   • ENDOSCOPY N/A 07/22/2022    Procedure: ESOPHAGOGASTRODUODENOSCOPY;  Surgeon: Chacha Clark MD;  Location: [facility] ENDOSCOPY;  Service: Gastroenterology;  Laterality: N/A;   • ERCP N/A 8/16/2022    Procedure: ENDOSCOPIC RETROGRADE CHOLANGIOPANCREATOGRAPHY;  Surgeon: Chacha Clark MD;  Location: [facility] ENDOSCOPY;  Service: Gastroenterology;  Laterality: N/A;   • GASTRIC BYPASS     • HYSTERECTOMY     • KNEE SURGERY Bilateral     multiple   • MASTECTOMY Bilateral    • NECK SURGERY      multiple   • RECTOCELE REPAIR      multiple     History reviewed. No pertinent family history.  OB History    No obstetric history on file.       Prior to Admission medications    Medication Sig Start Date End Date Taking? Authorizing Provider   aspirin 81 MG EC tablet Take 81 mg by mouth Daily.   Yes Provider,  MD Wiley   busPIRone (BUSPAR) 7.5 MG tablet Take 7.5 mg by mouth 2 (Two) Times a Day. 7/19/22  Yes Wiley Majano MD   celecoxib (CeleBREX) 200 MG capsule Take 200 mg by mouth Daily. 6/7/13  Yes Wiley Majano MD   cetirizine (zyrTEC) 10 MG tablet Take 10 mg by mouth Daily. 6/7/13  Yes Wiley Majano MD   clonazePAM (KlonoPIN) 0.5 MG tablet Take 0.5 mg by mouth Every Night. 11/21/18  Yes Wiley Majano MD   diphenhydrAMINE (BENADRYL) 25 mg capsule Take 25 mg by mouth Every Night.   Yes Wiley Majano MD   DULoxetine (CYMBALTA) 60 MG capsule Take 60 mg by mouth 2 (Two) Times a Day. 6/7/13  Yes Wiley Majano MD   levothyroxine (SYNTHROID, LEVOTHROID) 50 MCG tablet Take 50 mcg by mouth Daily.   Yes Wiley Majano MD   linaclotide (LINZESS) 290 MCG capsule capsule Take 1 capsule by mouth Every Morning Before Breakfast. 8/4/22  Yes Chacha Clark MD   lisinopril (PRINIVIL,ZESTRIL) 2.5 MG tablet Take 2.5 mg by mouth Daily. 7/20/22  Yes Wiley Majano MD   Multiple Vitamins-Minerals (PreserVision AREDS 2+Multi Vit) capsule Take 1 capsule by mouth 2 (Two) Times a Day.   Yes Wiley Majano MD   Omeprazole 20 MG tablet delayed-release Take 20 mg by mouth Daily. 6/7/13  Yes Wiley Majano MD   potassium chloride 10 MEQ CR tablet Take 10 mEq by mouth 2 (Two) Times a Day. 8/29/14  Yes Wiley Majano MD   Prenatal Multivit-Min-Fe-FA (Prenatal/Iron) tablet Take 1 tablet by mouth Daily.   Yes Wiley Majano MD   rizatriptan MLT (MAXALT-MLT) 10 MG disintegrating tablet Place 10 mg on the tongue As Needed. 5/25/22  Yes Wiley Majano MD   traZODone (DESYREL) 150 MG tablet Take 150 mg by mouth every night at bedtime. 5/25/22  Yes Wiley Majano MD   vitamin D3 125 MCG (5000 UT) capsule capsule Take 5,000 Units by mouth Daily.   Yes Provider, Historical, MD     Allergies   Allergen Reactions   • Lansoprazole Hives   •  "Propoxyphene GI Intolerance   • Tape Rash   • Valium [Diazepam] Anxiety     Social History     Socioeconomic History   • Marital status:    Tobacco Use   • Smoking status: Never   • Smokeless tobacco: Never   Vaping Use   • Vaping Use: Never used   Substance and Sexual Activity   • Alcohol use: Never   • Drug use: Never   • Sexual activity: Defer       Review of Systems  Review of Systems   Constitutional: Negative for chills, fatigue, fever and unexpected weight change.   HENT: Negative for congestion, ear discharge, hearing loss, nosebleeds and sore throat.    Eyes: Negative for pain, discharge and redness.   Respiratory: Negative for cough, chest tightness, shortness of breath and wheezing.    Cardiovascular: Negative for chest pain and palpitations.   Gastrointestinal: Positive for abdominal pain. Negative for abdominal distention, blood in stool, constipation, diarrhea, nausea and vomiting.   Endocrine: Negative for cold intolerance, polydipsia, polyphagia and polyuria.   Genitourinary: Negative for dysuria, flank pain, frequency, hematuria and urgency.   Musculoskeletal: Negative for arthralgias, back pain, joint swelling and myalgias.   Skin: Negative for color change, pallor and rash.   Neurological: Negative for tremors, seizures, syncope, weakness and headaches.   Hematological: Negative for adenopathy. Does not bruise/bleed easily.   Psychiatric/Behavioral: Negative for behavioral problems, confusion, dysphoric mood, hallucinations and suicidal ideas. The patient is not nervous/anxious.         /69 (BP Location: Right arm)   Pulse 67   Ht 152.4 cm (60\")   Wt 59.1 kg (130 lb 3.2 oz)   BMI 25.43 kg/m²     Objective    Physical Exam  Constitutional:       Appearance: She is well-developed.   HENT:      Head: Normocephalic and atraumatic.   Eyes:      Conjunctiva/sclera: Conjunctivae normal.      Pupils: Pupils are equal, round, and reactive to light.   Neck:      Thyroid: No thyromegaly. "   Cardiovascular:      Rate and Rhythm: Normal rate and regular rhythm.      Heart sounds: Normal heart sounds. No murmur heard.  Pulmonary:      Effort: Pulmonary effort is normal.      Breath sounds: Normal breath sounds. No wheezing.   Abdominal:      General: Bowel sounds are normal. There is no distension.      Palpations: Abdomen is soft. There is no mass.      Tenderness: There is no abdominal tenderness.      Hernia: No hernia is present.   Genitourinary:     Comments: No lesions noted  Musculoskeletal:         General: No tenderness. Normal range of motion.      Cervical back: Normal range of motion and neck supple.   Lymphadenopathy:      Cervical: No cervical adenopathy.   Skin:     General: Skin is warm and dry.      Findings: No rash.   Neurological:      Mental Status: She is alert and oriented to person, place, and time.      Cranial Nerves: No cranial nerve deficit.   Psychiatric:         Thought Content: Thought content normal.       Office Visit on 08/23/2022   Component Date Value Ref Range Status   • Glucose 08/23/2022 90  65 - 99 mg/dL Final   • BUN 08/23/2022 13  8 - 23 mg/dL Final   • Creatinine 08/23/2022 0.73  0.57 - 1.00 mg/dL Final   • Sodium 08/23/2022 140  136 - 145 mmol/L Final   • Potassium 08/23/2022 4.5  3.5 - 5.2 mmol/L Final   • Chloride 08/23/2022 104  98 - 107 mmol/L Final   • CO2 08/23/2022 26.2  22.0 - 29.0 mmol/L Final   • Calcium 08/23/2022 8.7  8.6 - 10.5 mg/dL Final   • Total Protein 08/23/2022 6.7  6.0 - 8.5 g/dL Final   • Albumin 08/23/2022 3.80  3.50 - 5.20 g/dL Final   • ALT (SGPT) 08/23/2022 58 (H)  1 - 33 U/L Final   • AST (SGOT) 08/23/2022 74 (H)  1 - 32 U/L Final   • Alkaline Phosphatase 08/23/2022 117  39 - 117 U/L Final   • Total Bilirubin 08/23/2022 0.7  0.0 - 1.2 mg/dL Final   • Globulin 08/23/2022 2.9  gm/dL Final   • A/G Ratio 08/23/2022 1.3  g/dL Final   • BUN/Creatinine Ratio 08/23/2022 17.8  7.0 - 25.0 Final   • Anion Gap 08/23/2022 9.8  5.0 - 15.0 mmol/L  Final   • eGFR 08/23/2022 88.0  >60.0 mL/min/1.73 Final    National Kidney Foundation and American Society of Nephrology (ASN) Task Force recommended calculation based on the Chronic Kidney Disease Epidemiology Collaboration (CKD-EPI) equation refit without adjustment for race.     Assessment & Plan    No diagnosis found..   1.  Abdominal pain and elevated LFTs, likely due to retained stent.  Obtain ERCP report.  Schedule EGD with CBD stent removal  2.  Abdominal pain with constipation, well controlled.  Continue probiotics and Linzess.  Continue high-fiber diet.  3.  Diverticulosis, continue high-fiber diet.  4.  History of gastric bypass with retained food noted upon attempted ERCP, gastric emptying scan was unremarkable.  Orders placed during this encounter include:  No orders of the defined types were placed in this encounter.      * Surgery not found *    Review and/or summary of lab tests, radiology, procedures, medications. Review and summary of old records and obtaining of history. The risks and benefits of my recommendations, as well as other treatment options were discussed with the patient today. Questions were answered.    No orders of the defined types were placed in this encounter.      Follow-up: No follow-ups on file.               Results for orders placed or performed in visit on 08/23/22   Comprehensive Metabolic Panel    Specimen: Blood   Result Value Ref Range    Glucose 90 65 - 99 mg/dL    BUN 13 8 - 23 mg/dL    Creatinine 0.73 0.57 - 1.00 mg/dL    Sodium 140 136 - 145 mmol/L    Potassium 4.5 3.5 - 5.2 mmol/L    Chloride 104 98 - 107 mmol/L    CO2 26.2 22.0 - 29.0 mmol/L    Calcium 8.7 8.6 - 10.5 mg/dL    Total Protein 6.7 6.0 - 8.5 g/dL    Albumin 3.80 3.50 - 5.20 g/dL    ALT (SGPT) 58 (H) 1 - 33 U/L    AST (SGOT) 74 (H) 1 - 32 U/L    Alkaline Phosphatase 117 39 - 117 U/L    Total Bilirubin 0.7 0.0 - 1.2 mg/dL    Globulin 2.9 gm/dL    A/G Ratio 1.3 g/dL    BUN/Creatinine Ratio 17.8 7.0 - 25.0     Anion Gap 9.8 5.0 - 15.0 mmol/L    eGFR 88.0 >60.0 mL/min/1.73   Results for orders placed or performed during the hospital encounter of 22   POC Glucose Once    Specimen: Blood   Result Value Ref Range    Glucose 197 (H) 70 - 130 mg/dL   Results for orders placed or performed during the hospital encounter of 22   TISSUE EXAM, [facility] ([facility])    Specimen: A: Stomach; Tissue    B: Esophagus; Tissue    C: Large Intestine; Tissue   Result Value Ref Range    Reference Lab Report       [facility]  [address] [place] [address]  Phone: [phone] or [phone]  Fax: [phone]  Spike Tracy M.D., Medical Director    PATIENT NAME                           LABORATORY NO.  [id number]  CHARLOTTE REID                      [id number]  [id number]                         AGE              SEX  SSN           CLIENT REF #  [facility]           71      1950  F    [id number]   [id number]    [place]                       REQUESTING M.D.     ATTENDING M.D.     COPY TO.  [address] [place] [address]                 DIAMOND LIRIANO ROBBIN  [address]  DATE COLLECTED      DATE RECEIVED      DATE REPORTED  2022    DIAGNOSIS:  A.   STOMACH, BIOPSY:  Reactive gastropathy  B.   GE JUNCTION:  Reactive gastric and squamous mucosa  Negative for specialized Robin's mucosa  C.   COLON, BIOPSY, MUCOSA:  No significant histologic  abnormality    JBS/sm    CLINICAL HISTORY:  Diarrhea, unspecified type, nausea, other constipation, hemorrhage of anus and  rectum    SPECIMENS RECEIVED:  A.  STOMACH, BIOPSY  B.  GE JUNCTION  C.  COLON, BIOPSY, MUCOSA    MICROSCOPIC DESCRIPTION:  Tissue blocks are prepared and slides are examined microscopically on all  specimens. See diagnosis for details.    Professional interpretation rendered by Nasim Richmond M.D. at [facility]  [place] [place] [address].    GROSS DESCRIPTION:  A.  Specimen is received in 1 formalin filled container labeled \"stomach  biopsy\" and consists of 3 portions of tan soft tissue measuring 0.4 x 0.4 x  0.2 cm in aggregate.  Submitted entirely in 1 cassette.  MTH  B.  Specimen is received in 1 formalin filled container labeled \"EG junction  biopsy\" and consists of 2 portions of tan soft tissue measuring 0.4 x 0.3 x  0.2 cm in aggregate.  Submitted entirely in 1 cassette.  C.  Specimen is received in 1 formalin filled container  labeled \"colonic  mucosa biopsy\" and consists of 3 portions of tan soft tissue measuring 0.5  x 0.4 x 0.2 cm in aggregate.  Submitted entirely in 1 cassette.    REVIEWED, DIAGNOSED AND ELECTRONICALLY  SIGNED BY:    Nasim Richmond M.D.  CPT CODES:  88305x3     Results for orders placed or performed during the hospital encounter of 07/12/22   COVID-19 and FLU A/B PCR - Swab, Nasopharynx    Specimen: Nasopharynx; Swab   Result Value Ref Range    COVID19 Not Detected Not Detected - Ref. Range    Influenza A PCR Not Detected Not Detected    Influenza B PCR Not Detected Not Detected   Urinalysis With Culture If Indicated - Urine, Clean Catch    Specimen: Urine, Clean Catch   Result Value Ref Range    Color, UA Yellow Yellow, Straw, Dark Yellow, Eleanor    Appearance, UA Clear Clear    pH, UA 7.5 5.0 - 9.0    Specific [place], UA 1.014 1.003 - 1.030    Glucose, UA Negative Negative    Ketones, UA 15 mg/dL (1+) (A) Negative    Bilirubin, UA Negative Negative    Blood, UA Negative Negative    Protein, UA Negative Negative    Leuk Esterase, UA Negative Negative    Nitrite, UA Negative Negative    Urobilinogen, UA 1.0 E.U./dL 0.2 - 1.0 E.U./dL   CBC Auto Differential    Specimen: Blood   Result Value Ref Range    WBC 8.02 3.40 - 10.80 10*3/mm3    RBC 4.41 3.77 - 5.28 10*6/mm3    Hemoglobin 12.7 12.0 - 15.9 g/dL    Hematocrit 36.4 34.0 - 46.6 %    MCV 82.5 79.0 - 97.0 fL    MCH 28.8 26.6 - " 33.0 pg    MCHC 34.9 31.5 - 35.7 g/dL    RDW 12.0 (L) 12.3 - 15.4 %    RDW-SD 35.9 (L) 37.0 - 54.0 fl    MPV 9.8 6.0 - 12.0 fL    Platelets 269 140 - 450 10*3/mm3    Neutrophil % 60.0 42.7 - 76.0 %    Lymphocyte % 30.0 19.6 - 45.3 %    Monocyte % 8.7 5.0 - 12.0 %    Eosinophil % 0.1 (L) 0.3 - 6.2 %    Basophil % 1.0 0.0 - 1.5 %    Immature Grans % 0.2 0.0 - 0.5 %    Neutrophils, Absolute 4.80 1.70 - 7.00 10*3/mm3    Lymphocytes, Absolute 2.41 0.70 - 3.10 10*3/mm3    Monocytes, Absolute 0.70 0.10 - 0.90 10*3/mm3    Eosinophils, Absolute 0.01 0.00 - 0.40 10*3/mm3    Basophils, Absolute 0.08 0.00 - 0.20 10*3/mm3    Immature Grans, Absolute 0.02 0.00 - 0.05 10*3/mm3    nRBC 0.0 0.0 - 0.2 /100 WBC   Troponin    Specimen: Blood   Result Value Ref Range    Troponin T <0.010 0.000 - 0.030 ng/mL   Lipase    Specimen: Blood   Result Value Ref Range    Lipase 43 13 - 60 U/L   Comprehensive Metabolic Panel    Specimen: Blood   Result Value Ref Range    Glucose 127 (H) 65 - 99 mg/dL    BUN 9 8 - 23 mg/dL    Creatinine 0.70 0.57 - 1.00 mg/dL    Sodium 137 136 - 145 mmol/L    Potassium 3.5 3.5 - 5.2 mmol/L    Chloride 104 98 - 107 mmol/L    CO2 20.0 (L) 22.0 - 29.0 mmol/L    Calcium 8.7 8.6 - 10.5 mg/dL    Total Protein 6.8 6.0 - 8.5 g/dL    Albumin 3.90 3.50 - 5.20 g/dL    ALT (SGPT) 52 (H) 1 - 33 U/L    AST (SGOT) 45 (H) 1 - 32 U/L    Alkaline Phosphatase 111 39 - 117 U/L    Total Bilirubin 1.4 (H) 0.0 - 1.2 mg/dL    Globulin 2.9 gm/dL    A/G Ratio 1.3 g/dL    BUN/Creatinine Ratio 12.9 7.0 - 25.0    Anion Gap 13.0 5.0 - 15.0 mmol/L    eGFR 92.6 >60.0 mL/min/1.73   ECG 12 Lead   Result Value Ref Range    QT Interval 418 ms    QTC Interval 431 ms   Results for orders placed or performed in visit on 01/14/14   Hemoglobin and hematocrit, blood    Specimen: Blood   Result Value Ref Range    Hemoglobin 12.3 11.9 - 15.5 g/dL    Hematocrit 37.7 35.6 - 45.5 %   Hemoglobin and hematocrit, blood    Specimen: Blood   Result Value Ref Range     Hemoglobin 12.2 11.9 - 15.5 g/dL    Hematocrit 37.9 35.6 - 45.5 %   Basic metabolic panel    Specimen: Blood   Result Value Ref Range    Glucose 102 74 - 106 mg/dL    BUN 9 8 - 23 mg/dL    Creatinine 0.56 0.50 - 0.90 mg/dL    Sodium 138 136 - 145 mmol/L    Potassium 3.6 3.5 - 5.0 mmol/L    Chloride 102 98 - 107 mmol/L    CO2 27 22 - 29 mmol/L    Calcium 8.4 (L) 8.8 - 10.2 mg/dL    eGFR >60 ml/min/1.732   Basic metabolic panel    Specimen: Blood   Result Value Ref Range    Glucose 148 (H) 74 - 106 mg/dL    BUN 8 8 - 23 mg/dL    Creatinine 0.60 0.50 - 0.90 mg/dL    Sodium 140 136 - 145 mmol/L    Potassium 3.7 3.5 - 5.0 mmol/L    Chloride 104 98 - 107 mmol/L    CO2 25 22 - 29 mmol/L    Calcium 8.4 (L) 8.6 - 10.2 mg/dL    eGFR >60 ml/min/1.732     *Note: Due to a large number of results and/or encounters for the requested time period, some results have not been displayed. A complete set of results can be found in Results Review.         This document has been electronically signed by Chacha Clark MD on October 16, 2022 07:57 CDT

## 2023-01-09 RX ORDER — LINACLOTIDE 290 UG/1
CAPSULE, GELATIN COATED ORAL
Qty: 90 CAPSULE | Refills: 1 | Status: SHIPPED | OUTPATIENT
Start: 2023-01-09

## 2023-02-24 ENCOUNTER — OFFICE VISIT (OUTPATIENT)
Dept: GASTROENTEROLOGY | Facility: CLINIC | Age: 73
End: 2023-02-24
Payer: MEDICARE

## 2023-02-24 VITALS
DIASTOLIC BLOOD PRESSURE: 63 MMHG | BODY MASS INDEX: 25.4 KG/M2 | HEIGHT: 60 IN | SYSTOLIC BLOOD PRESSURE: 120 MMHG | WEIGHT: 129.4 LBS | HEART RATE: 67 BPM

## 2023-02-24 DIAGNOSIS — R10.10 PAIN OF UPPER ABDOMEN: Primary | ICD-10-CM

## 2023-02-24 PROCEDURE — 99213 OFFICE O/P EST LOW 20 MIN: CPT | Performed by: INTERNAL MEDICINE

## 2023-02-24 RX ORDER — TIMOLOL 2.56 MG/ML
1 SOLUTION/ DROPS OPHTHALMIC DAILY
COMMUNITY
Start: 2023-02-10

## 2023-02-25 NOTE — PROGRESS NOTES
Chief Complaint   Patient presents with   • Abdominal Pain       Subjective    Ariana Doshi is a 72 y.o. female.    History of Present Illness  Patient presented to the GI clinic for follow-up visit today.  Has continued symptoms with epigastric pain and fatigue.  Also has nausea and denied vomiting.  Weight is stable.  Bilirubin 1.4 ALT 52 and AST 45.       The following portions of the patient's history were reviewed and updated as appropriate:   Past Medical History:   Diagnosis Date   • Breast cancer (HCC)    • Diabetes mellitus (HCC)     diet controlled   • Disease of thyroid gland    • GERD (gastroesophageal reflux disease)    • History of transfusion    • MI (myocardial infarction) (HCC)    • Sleep apnea      Past Surgical History:   Procedure Laterality Date   • BACK SURGERY      multiple   • CHOLECYSTECTOMY     • COLONOSCOPY N/A 07/22/2022    Procedure: COLONOSCOPY;  Surgeon: Chahca Clark MD;  Location: [facility] ENDOSCOPY;  Service: Gastroenterology;  Laterality: N/A;   • DILATATION AND CURETTAGE      multiple   • ENDOSCOPY N/A 07/22/2022    Procedure: ESOPHAGOGASTRODUODENOSCOPY;  Surgeon: Chacha Clark MD;  Location: [facility] ENDOSCOPY;  Service: Gastroenterology;  Laterality: N/A;   • ERCP N/A 8/16/2022    Procedure: ENDOSCOPIC RETROGRADE CHOLANGIOPANCREATOGRAPHY;  Surgeon: Chacha Clark MD;  Location: [facility] ENDOSCOPY;  Service: Gastroenterology;  Laterality: N/A;   • GASTRIC BYPASS     • HYSTERECTOMY     • KNEE SURGERY Bilateral     multiple   • MASTECTOMY Bilateral    • NECK SURGERY      multiple   • RECTOCELE REPAIR      multiple     History reviewed. No pertinent family history.  OB History    No obstetric history on file.       Prior to Admission medications    Medication Sig Start Date End Date Taking? Authorizing Provider   aspirin 81 MG EC tablet Take 81 mg by mouth Daily.   Yes Provider, MD Wiley   busPIRone (BUSPAR) 7.5 MG tablet Take 7.5 mg by mouth 2 (Two) Times a Day.  7/19/22  Yes Wiley Majano MD   celecoxib (CeleBREX) 200 MG capsule Take 200 mg by mouth Daily. 6/7/13  Yes Wiley Majano MD   cetirizine (zyrTEC) 10 MG tablet Take 10 mg by mouth Daily. 6/7/13  Yes Wiley Majano MD   clonazePAM (KlonoPIN) 0.5 MG tablet Take 0.5 mg by mouth Every Night. 11/21/18  Yes Wiley Majano MD   diphenhydrAMINE (BENADRYL) 25 mg capsule Take 25 mg by mouth Every Night.   Yes Wiley Majano MD   DULoxetine (CYMBALTA) 60 MG capsule Take 60 mg by mouth 2 (Two) Times a Day. 6/7/13  Yes Wiley Majano MD   levothyroxine (SYNTHROID, LEVOTHROID) 50 MCG tablet Take 50 mcg by mouth Daily.   Yes Wiley Majano MD   Linzess 290 MCG capsule capsule TAKE 1 CAPSULE BY MOUTH EVERY DAY IN THE MORNING BEFORE BREAKFAST 1/9/23  Yes Chacha Clark MD   lisinopril (PRINIVIL,ZESTRIL) 2.5 MG tablet Take 2.5 mg by mouth Daily. 7/20/22  Yes Wiley Majano MD   Multiple Vitamins-Minerals (PreserVision AREDS 2+Multi Vit) capsule Take 1 capsule by mouth 2 (Two) Times a Day.   Yes Wiley Majano MD   Omeprazole 20 MG tablet delayed-release Take 20 mg by mouth Daily. 6/7/13  Yes Wiley Majano MD   potassium chloride 10 MEQ CR tablet Take 10 mEq by mouth 2 (Two) Times a Day. 8/29/14  Yes Wiley Majano MD   Prenatal Multivit-Min-Fe-FA (Prenatal/Iron) tablet Take 1 tablet by mouth Daily.   Yes Wiley Majano MD   rizatriptan MLT (MAXALT-MLT) 10 MG disintegrating tablet Place 10 mg on the tongue As Needed. 5/25/22  Yes Wiley Majano MD   traZODone (DESYREL) 150 MG tablet Take 150 mg by mouth every night at bedtime. 5/25/22  Yes Wiley Majano MD   vitamin D3 125 MCG (5000 UT) capsule capsule Take 5,000 Units by mouth Daily.   Yes Wiley Majano MD   Betimol 0.25 % ophthalmic solution Administer 1 drop to both eyes Daily. 2/10/23   Provider, Historical, MD     Allergies   Allergen Reactions   • Lansoprazole Hives  "  • Propoxyphene GI Intolerance   • Tape Rash   • Valium [Diazepam] Anxiety     Social History     Socioeconomic History   • Marital status:    Tobacco Use   • Smoking status: Never   • Smokeless tobacco: Never   Vaping Use   • Vaping Use: Never used   Substance and Sexual Activity   • Alcohol use: Never   • Drug use: Never   • Sexual activity: Defer       Review of Systems  Review of Systems   Constitutional: Positive for fatigue. Negative for chills, fever and unexpected weight change.   HENT: Negative for congestion, ear discharge, hearing loss, nosebleeds and sore throat.    Eyes: Negative for pain, discharge and redness.   Respiratory: Negative for cough, chest tightness, shortness of breath and wheezing.    Cardiovascular: Negative for chest pain and palpitations.   Gastrointestinal: Positive for abdominal pain and nausea. Negative for abdominal distention, blood in stool, constipation, diarrhea and vomiting.   Endocrine: Negative for cold intolerance, polydipsia, polyphagia and polyuria.   Genitourinary: Negative for dysuria, flank pain, frequency, hematuria and urgency.   Musculoskeletal: Negative for arthralgias, back pain, joint swelling and myalgias.   Skin: Negative for color change, pallor and rash.   Neurological: Negative for tremors, seizures, syncope, weakness and headaches.   Hematological: Negative for adenopathy. Does not bruise/bleed easily.   Psychiatric/Behavioral: Negative for behavioral problems, confusion, dysphoric mood, hallucinations and suicidal ideas. The patient is not nervous/anxious.         /63 (BP Location: Right arm)   Pulse 67   Ht 152.4 cm (60\")   Wt 58.7 kg (129 lb 6.4 oz)   BMI 25.27 kg/m²     Objective    Physical Exam  Constitutional:       Appearance: She is well-developed.   HENT:      Head: Normocephalic and atraumatic.   Eyes:      Conjunctiva/sclera: Conjunctivae normal.      Pupils: Pupils are equal, round, and reactive to light.   Neck:      Thyroid: " No thyromegaly.   Cardiovascular:      Rate and Rhythm: Normal rate and regular rhythm.      Heart sounds: Normal heart sounds. No murmur heard.  Pulmonary:      Effort: Pulmonary effort is normal.      Breath sounds: Normal breath sounds. No wheezing.   Abdominal:      General: Bowel sounds are normal. There is no distension.      Palpations: Abdomen is soft. There is no mass.      Tenderness: There is no abdominal tenderness.      Hernia: No hernia is present.   Genitourinary:     Comments: No lesions noted  Musculoskeletal:         General: No tenderness. Normal range of motion.      Cervical back: Normal range of motion and neck supple.   Lymphadenopathy:      Cervical: No cervical adenopathy.   Skin:     General: Skin is warm and dry.      Findings: No rash.   Neurological:      Mental Status: She is alert and oriented to person, place, and time.      Cranial Nerves: No cranial nerve deficit.   Psychiatric:         Thought Content: Thought content normal.       Office Visit on 08/23/2022   Component Date Value Ref Range Status   • Glucose 08/23/2022 90  65 - 99 mg/dL Final   • BUN 08/23/2022 13  8 - 23 mg/dL Final   • Creatinine 08/23/2022 0.73  0.57 - 1.00 mg/dL Final   • Sodium 08/23/2022 140  136 - 145 mmol/L Final   • Potassium 08/23/2022 4.5  3.5 - 5.2 mmol/L Final   • Chloride 08/23/2022 104  98 - 107 mmol/L Final   • CO2 08/23/2022 26.2  22.0 - 29.0 mmol/L Final   • Calcium 08/23/2022 8.7  8.6 - 10.5 mg/dL Final   • Total Protein 08/23/2022 6.7  6.0 - 8.5 g/dL Final   • Albumin 08/23/2022 3.80  3.50 - 5.20 g/dL Final   • ALT (SGPT) 08/23/2022 58 (H)  1 - 33 U/L Final   • AST (SGOT) 08/23/2022 74 (H)  1 - 32 U/L Final   • Alkaline Phosphatase 08/23/2022 117  39 - 117 U/L Final   • Total Bilirubin 08/23/2022 0.7  0.0 - 1.2 mg/dL Final   • Globulin 08/23/2022 2.9  gm/dL Final   • A/G Ratio 08/23/2022 1.3  g/dL Final   • BUN/Creatinine Ratio 08/23/2022 17.8  7.0 - 25.0 Final   • Anion Gap 08/23/2022 9.8  5.0  - 15.0 mmol/L Final   • eGFR 08/23/2022 88.0  >60.0 mL/min/1.73 Final    National Kidney Foundation and American Society of Nephrology (ASN) Task Force recommended calculation based on the Chronic Kidney Disease Epidemiology Collaboration (CKD-EPI) equation refit without adjustment for race.     Assessment & Plan      1. Pain of upper abdomen    1.  Epigastric pain and nausea, likely due to biliary pathology.  Refer patient to Dr. Esposito for endoscopic ultrasound and possible ERCP.  2.  Constipation, well controlled.  Continue probiotics and Linzess.  3.  Diverticulosis, continue high-fiber diet.  4.  Elevated liver enzymes, improving.  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  Refer to Dr. Esposito for ERCP.       Orders placed during this encounter include:  Orders Placed This Encounter   Procedures   • Hepatic Function Panel     Order Specific Question:   Release to patient     Answer:   Routine Release   • Ambulatory Referral to Gastroenterology     Referral Priority:   Routine     Referral Type:   Consultation     Referral Reason:   Specialty Services Required     Referred to Provider:   Vaibhav Treviño MD     Requested Specialty:   Gastroenterology     Number of Visits Requested:   1       * Surgery not found *    Review and/or summary of lab tests, radiology, procedures, medications. Review and summary of old records and obtaining of history. The risks and benefits of my recommendations, as well as other treatment options were discussed with the patient today. Questions were answered.    No orders of the defined types were placed in this encounter.      Follow-up: Return in about 1 month (around 3/24/2023).               Results for orders placed or performed in visit on 08/23/22   Comprehensive Metabolic Panel    Specimen: Blood   Result Value Ref Range    Glucose 90 65 - 99 mg/dL    BUN 13 8 - 23 mg/dL    Creatinine 0.73 0.57 - 1.00 mg/dL    Sodium 140 136 - 145 mmol/L    Potassium 4.5 3.5 - 5.2 mmol/L    Chloride 104  98 - 107 mmol/L    CO2 26.2 22.0 - 29.0 mmol/L    Calcium 8.7 8.6 - 10.5 mg/dL    Total Protein 6.7 6.0 - 8.5 g/dL    Albumin 3.80 3.50 - 5.20 g/dL    ALT (SGPT) 58 (H) 1 - 33 U/L    AST (SGOT) 74 (H) 1 - 32 U/L    Alkaline Phosphatase 117 39 - 117 U/L    Total Bilirubin 0.7 0.0 - 1.2 mg/dL    Globulin 2.9 gm/dL    A/G Ratio 1.3 g/dL    BUN/Creatinine Ratio 17.8 7.0 - 25.0    Anion Gap 9.8 5.0 - 15.0 mmol/L    eGFR 88.0 >60.0 mL/min/1.73   Results for orders placed or performed during the hospital encounter of 22   POC Glucose Once    Specimen: Blood   Result Value Ref Range    Glucose 197 (H) 70 - 130 mg/dL   Results for orders placed or performed during the hospital encounter of 22   TISSUE EXAM, [facility] ([facility])    Specimen: A: Stomach; Tissue    B: Esophagus; Tissue    C: Large Intestine; Tissue   Result Value Ref Range    Reference Lab Report       [facility]  [address] [place] [address]  Phone: [phone] or [phone]  Fax: [phone]  Spike Tracy M.D., Medical Director    PATIENT NAME                           LABORATORY NO.  [id number]                      [id number]  [id number]                         AGE              SEX  SSN           CLIENT REF #  [facility]           71      1950  F    [id number]   [id number]    [place]                       REQUESTING M.D.     ATTENDING M.D.     COPY TO.  [address] [place] [address]                 DIAMOND LIRIANO ROBBIN  [address]  DATE COLLECTED      DATE RECEIVED      DATE REPORTED  2022    DIAGNOSIS:  A.   STOMACH, BIOPSY:  Reactive gastropathy  B.   GE JUNCTION:  Reactive gastric and squamous mucosa  Negative for specialized Robin's mucosa  C.   COLON, BIOPSY, MUCOSA:  No significant histologic  abnormality    JBS/sm    CLINICAL HISTORY:  Diarrhea, unspecified type,  "nausea, other constipation, hemorrhage of anus and  rectum    SPECIMENS RECEIVED:  A.  STOMACH, BIOPSY  B.  GE JUNCTION  C.  COLON, BIOPSY, MUCOSA    MICROSCOPIC DESCRIPTION:  Tissue blocks are prepared and slides are examined microscopically on all  specimens. See diagnosis for details.    Professional interpretation rendered by Nasim Richmond M.D. at [facility], [address] [place] [address].    GROSS DESCRIPTION:  A.  Specimen is received in 1 formalin filled container labeled \"stomach  biopsy\" and consists of 3 portions of tan soft tissue measuring 0.4 x 0.4 x  0.2 cm in aggregate.  Submitted entirely in 1 cassette.  MTH  B.  Specimen is received in 1 formalin filled container labeled \"EG junction  biopsy\" and consists of 2 portions of tan soft tissue measuring 0.4 x 0.3 x  0.2 cm in aggregate.  Submitted entirely in 1 cassette.  C.  Specimen is received in 1 formalin filled container  labeled \"colonic  mucosa biopsy\" and consists of 3 portions of tan soft tissue measuring 0.5  x 0.4 x 0.2 cm in aggregate.  Submitted entirely in 1 cassette.    REVIEWED, DIAGNOSED AND ELECTRONICALLY  SIGNED BY:    Nasim Richmond M.D.  CPT CODES:  88305x3     Results for orders placed or performed during the hospital encounter of 07/12/22   COVID-19 and FLU A/B PCR - Swab, Nasopharynx    Specimen: Nasopharynx; Swab   Result Value Ref Range    COVID19 Not Detected Not Detected - Ref. Range    Influenza A PCR Not Detected Not Detected    Influenza B PCR Not Detected Not Detected   Urinalysis With Culture If Indicated - Urine, Clean Catch    Specimen: Urine, Clean Catch   Result Value Ref Range    Color, UA Yellow Yellow, Straw, Dark Yellow, Eleanor    Appearance, UA Clear Clear    pH, UA 7.5 5.0 - 9.0    Specific [place], UA 1.014 1.003 - 1.030    Glucose, UA Negative Negative    Ketones, UA 15 mg/dL (1+) (A) Negative    Bilirubin, UA Negative Negative    Blood, UA Negative Negative    Protein, UA Negative " Negative    Leuk Esterase, UA Negative Negative    Nitrite, UA Negative Negative    Urobilinogen, UA 1.0 E.U./dL 0.2 - 1.0 E.U./dL   CBC Auto Differential    Specimen: Blood   Result Value Ref Range    WBC 8.02 3.40 - 10.80 10*3/mm3    RBC 4.41 3.77 - 5.28 10*6/mm3    Hemoglobin 12.7 12.0 - 15.9 g/dL    Hematocrit 36.4 34.0 - 46.6 %    MCV 82.5 79.0 - 97.0 fL    MCH 28.8 26.6 - 33.0 pg    MCHC 34.9 31.5 - 35.7 g/dL    RDW 12.0 (L) 12.3 - 15.4 %    RDW-SD 35.9 (L) 37.0 - 54.0 fl    MPV 9.8 6.0 - 12.0 fL    Platelets 269 140 - 450 10*3/mm3    Neutrophil % 60.0 42.7 - 76.0 %    Lymphocyte % 30.0 19.6 - 45.3 %    Monocyte % 8.7 5.0 - 12.0 %    Eosinophil % 0.1 (L) 0.3 - 6.2 %    Basophil % 1.0 0.0 - 1.5 %    Immature Grans % 0.2 0.0 - 0.5 %    Neutrophils, Absolute 4.80 1.70 - 7.00 10*3/mm3    Lymphocytes, Absolute 2.41 0.70 - 3.10 10*3/mm3    Monocytes, Absolute 0.70 0.10 - 0.90 10*3/mm3    Eosinophils, Absolute 0.01 0.00 - 0.40 10*3/mm3    Basophils, Absolute 0.08 0.00 - 0.20 10*3/mm3    Immature Grans, Absolute 0.02 0.00 - 0.05 10*3/mm3    nRBC 0.0 0.0 - 0.2 /100 WBC   Troponin    Specimen: Blood   Result Value Ref Range    Troponin T <0.010 0.000 - 0.030 ng/mL   Lipase    Specimen: Blood   Result Value Ref Range    Lipase 43 13 - 60 U/L   Comprehensive Metabolic Panel    Specimen: Blood   Result Value Ref Range    Glucose 127 (H) 65 - 99 mg/dL    BUN 9 8 - 23 mg/dL    Creatinine 0.70 0.57 - 1.00 mg/dL    Sodium 137 136 - 145 mmol/L    Potassium 3.5 3.5 - 5.2 mmol/L    Chloride 104 98 - 107 mmol/L    CO2 20.0 (L) 22.0 - 29.0 mmol/L    Calcium 8.7 8.6 - 10.5 mg/dL    Total Protein 6.8 6.0 - 8.5 g/dL    Albumin 3.90 3.50 - 5.20 g/dL    ALT (SGPT) 52 (H) 1 - 33 U/L    AST (SGOT) 45 (H) 1 - 32 U/L    Alkaline Phosphatase 111 39 - 117 U/L    Total Bilirubin 1.4 (H) 0.0 - 1.2 mg/dL    Globulin 2.9 gm/dL    A/G Ratio 1.3 g/dL    BUN/Creatinine Ratio 12.9 7.0 - 25.0    Anion Gap 13.0 5.0 - 15.0 mmol/L    eGFR 92.6 >60.0  mL/min/1.73   ECG 12 Lead   Result Value Ref Range    QT Interval 418 ms    QTC Interval 431 ms   Results for orders placed or performed in visit on 01/14/14   Hemoglobin and hematocrit, blood    Specimen: Blood   Result Value Ref Range    Hemoglobin 12.3 11.9 - 15.5 g/dL    Hematocrit 37.7 35.6 - 45.5 %   Hemoglobin and hematocrit, blood    Specimen: Blood   Result Value Ref Range    Hemoglobin 12.2 11.9 - 15.5 g/dL    Hematocrit 37.9 35.6 - 45.5 %   Basic metabolic panel    Specimen: Blood   Result Value Ref Range    Glucose 102 74 - 106 mg/dL    BUN 9 8 - 23 mg/dL    Creatinine 0.56 0.50 - 0.90 mg/dL    Sodium 138 136 - 145 mmol/L    Potassium 3.6 3.5 - 5.0 mmol/L    Chloride 102 98 - 107 mmol/L    CO2 27 22 - 29 mmol/L    Calcium 8.4 (L) 8.8 - 10.2 mg/dL    eGFR >60 ml/min/1.732   Basic metabolic panel    Specimen: Blood   Result Value Ref Range    Glucose 148 (H) 74 - 106 mg/dL    BUN 8 8 - 23 mg/dL    Creatinine 0.60 0.50 - 0.90 mg/dL    Sodium 140 136 - 145 mmol/L    Potassium 3.7 3.5 - 5.0 mmol/L    Chloride 104 98 - 107 mmol/L    CO2 25 22 - 29 mmol/L    Calcium 8.4 (L) 8.6 - 10.2 mg/dL    eGFR >60 ml/min/1.732     *Note: Due to a large number of results and/or encounters for the requested time period, some results have not been displayed. A complete set of results can be found in Results Review.         This document has been electronically signed by Chacha Clark MD on February 24, 2023 22:31 CST

## 2023-02-27 ENCOUNTER — LAB (OUTPATIENT)
Dept: LAB | Facility: HOSPITAL | Age: 73
End: 2023-02-27
Payer: MEDICARE

## 2023-02-27 LAB
ALBUMIN SERPL-MCNC: 4 G/DL (ref 3.5–5.2)
ALP SERPL-CCNC: 126 U/L (ref 39–117)
ALT SERPL W P-5'-P-CCNC: 34 U/L (ref 1–33)
AST SERPL-CCNC: 39 U/L (ref 1–32)
BILIRUB CONJ SERPL-MCNC: <0.2 MG/DL (ref 0–0.3)
BILIRUB INDIRECT SERPL-MCNC: ABNORMAL MG/DL
BILIRUB SERPL-MCNC: 0.5 MG/DL (ref 0–1.2)
PROT SERPL-MCNC: 7.1 G/DL (ref 6–8.5)

## 2023-02-27 PROCEDURE — 36415 COLL VENOUS BLD VENIPUNCTURE: CPT | Performed by: INTERNAL MEDICINE

## 2023-02-27 PROCEDURE — 80076 HEPATIC FUNCTION PANEL: CPT | Performed by: INTERNAL MEDICINE

## 2023-03-16 ENCOUNTER — CLINICAL SUPPORT (OUTPATIENT)
Dept: CARDIOLOGY | Facility: CLINIC | Age: 73
End: 2023-03-16
Payer: MEDICARE

## 2023-03-16 ENCOUNTER — TRANSCRIBE ORDERS (OUTPATIENT)
Dept: LAB | Facility: HOSPITAL | Age: 73
End: 2023-03-16
Payer: MEDICARE

## 2023-03-16 ENCOUNTER — TRANSCRIBE ORDERS (OUTPATIENT)
Dept: CARDIOLOGY | Facility: CLINIC | Age: 73
End: 2023-03-16
Payer: MEDICARE

## 2023-03-16 ENCOUNTER — LAB (OUTPATIENT)
Dept: LAB | Facility: HOSPITAL | Age: 73
End: 2023-03-16
Payer: MEDICARE

## 2023-03-16 DIAGNOSIS — R74.8 ABNORMAL LIVER ENZYMES: Primary | ICD-10-CM

## 2023-03-16 DIAGNOSIS — Z01.818 PRE-OP EXAM: Primary | ICD-10-CM

## 2023-03-16 DIAGNOSIS — R10.10 UPPER ABDOMINAL PAIN: ICD-10-CM

## 2023-03-16 DIAGNOSIS — R74.8 ELEVATED LIVER ENZYMES: Primary | ICD-10-CM

## 2023-03-16 LAB
BASOPHILS # BLD AUTO: 0.08 10*3/MM3 (ref 0–0.2)
BASOPHILS NFR BLD AUTO: 1.2 % (ref 0–1.5)
DEPRECATED RDW RBC AUTO: 41.5 FL (ref 37–54)
EOSINOPHIL # BLD AUTO: 0.23 10*3/MM3 (ref 0–0.4)
EOSINOPHIL NFR BLD AUTO: 3.5 % (ref 0.3–6.2)
ERYTHROCYTE [DISTWIDTH] IN BLOOD BY AUTOMATED COUNT: 12.8 % (ref 12.3–15.4)
HCT VFR BLD AUTO: 36.9 % (ref 34–46.6)
HGB BLD-MCNC: 11.4 G/DL (ref 12–15.9)
IMM GRANULOCYTES # BLD AUTO: 0.02 10*3/MM3 (ref 0–0.05)
IMM GRANULOCYTES NFR BLD AUTO: 0.3 % (ref 0–0.5)
LYMPHOCYTES # BLD AUTO: 2.46 10*3/MM3 (ref 0.7–3.1)
LYMPHOCYTES NFR BLD AUTO: 37.6 % (ref 19.6–45.3)
MCH RBC QN AUTO: 27.9 PG (ref 26.6–33)
MCHC RBC AUTO-ENTMCNC: 30.9 G/DL (ref 31.5–35.7)
MCV RBC AUTO: 90.4 FL (ref 79–97)
MONOCYTES # BLD AUTO: 0.59 10*3/MM3 (ref 0.1–0.9)
MONOCYTES NFR BLD AUTO: 9 % (ref 5–12)
NEUTROPHILS NFR BLD AUTO: 3.17 10*3/MM3 (ref 1.7–7)
NEUTROPHILS NFR BLD AUTO: 48.4 % (ref 42.7–76)
NRBC BLD AUTO-RTO: 0 /100 WBC (ref 0–0.2)
PLATELET # BLD AUTO: 314 10*3/MM3 (ref 140–450)
PMV BLD AUTO: 10.4 FL (ref 6–12)
RBC # BLD AUTO: 4.08 10*6/MM3 (ref 3.77–5.28)
WBC NRBC COR # BLD: 6.55 10*3/MM3 (ref 3.4–10.8)

## 2023-03-16 PROCEDURE — 93000 ELECTROCARDIOGRAM COMPLETE: CPT | Performed by: INTERNAL MEDICINE

## 2023-03-16 PROCEDURE — 36415 COLL VENOUS BLD VENIPUNCTURE: CPT | Performed by: INTERNAL MEDICINE

## 2023-03-16 PROCEDURE — 80053 COMPREHEN METABOLIC PANEL: CPT | Performed by: INTERNAL MEDICINE

## 2023-03-16 PROCEDURE — 85025 COMPLETE CBC W/AUTO DIFF WBC: CPT | Performed by: INTERNAL MEDICINE

## 2023-03-17 LAB
ALBUMIN SERPL-MCNC: 3.7 G/DL (ref 3.5–5.2)
ALBUMIN/GLOB SERPL: 1.1 G/DL
ALP SERPL-CCNC: 128 U/L (ref 39–117)
ALT SERPL W P-5'-P-CCNC: 66 U/L (ref 1–33)
ANION GAP SERPL CALCULATED.3IONS-SCNC: 9 MMOL/L (ref 5–15)
AST SERPL-CCNC: 80 U/L (ref 1–32)
BILIRUB SERPL-MCNC: 0.5 MG/DL (ref 0–1.2)
BUN SERPL-MCNC: 12 MG/DL (ref 8–23)
BUN/CREAT SERPL: 14.1 (ref 7–25)
CALCIUM SPEC-SCNC: 8.5 MG/DL (ref 8.6–10.5)
CHLORIDE SERPL-SCNC: 105 MMOL/L (ref 98–107)
CO2 SERPL-SCNC: 26 MMOL/L (ref 22–29)
CREAT SERPL-MCNC: 0.85 MG/DL (ref 0.57–1)
EGFRCR SERPLBLD CKD-EPI 2021: 72.9 ML/MIN/1.73
GLOBULIN UR ELPH-MCNC: 3.4 GM/DL
GLUCOSE SERPL-MCNC: 117 MG/DL (ref 65–99)
POTASSIUM SERPL-SCNC: 4.7 MMOL/L (ref 3.5–5.2)
PROT SERPL-MCNC: 7.1 G/DL (ref 6–8.5)
SODIUM SERPL-SCNC: 140 MMOL/L (ref 136–145)

## 2023-03-18 LAB
QT INTERVAL: 400 MS
QTC INTERVAL: 419 MS

## 2023-03-28 ENCOUNTER — OFFICE VISIT (OUTPATIENT)
Dept: GASTROENTEROLOGY | Facility: CLINIC | Age: 73
End: 2023-03-28
Payer: MEDICARE

## 2023-03-28 VITALS
HEART RATE: 70 BPM | SYSTOLIC BLOOD PRESSURE: 104 MMHG | HEIGHT: 60 IN | DIASTOLIC BLOOD PRESSURE: 54 MMHG | WEIGHT: 127.4 LBS | BODY MASS INDEX: 25.01 KG/M2

## 2023-03-28 DIAGNOSIS — R10.10 PAIN OF UPPER ABDOMEN: Primary | ICD-10-CM

## 2023-03-28 PROCEDURE — 1159F MED LIST DOCD IN RCRD: CPT | Performed by: INTERNAL MEDICINE

## 2023-03-28 PROCEDURE — 99213 OFFICE O/P EST LOW 20 MIN: CPT | Performed by: INTERNAL MEDICINE

## 2023-03-28 PROCEDURE — 1160F RVW MEDS BY RX/DR IN RCRD: CPT | Performed by: INTERNAL MEDICINE

## 2023-03-28 RX ORDER — NALOXONE HYDROCHLORIDE 4 MG/.1ML
1 SPRAY NASAL AS NEEDED
COMMUNITY
Start: 2023-01-11

## 2023-04-16 NOTE — PROGRESS NOTES
Chief Complaint   Patient presents with   • Abdominal Pain     1 month f/u      • Elevated Hepatic Enzymes       Subjective    Ariana Doshi is a 72 y.o. female.    History of Present Illness  Patient presented to GI clinic for follow-up visit today.  Feels some better currently.  Seen by Dr. Salas  For endoscopic ultrasound and ERCP on Monday.  Abdominal pain is stable.  Denies nausea or vomiting.  Tolerating diet well.       The following portions of the patient's history were reviewed and updated as appropriate:   Past Medical History:   Diagnosis Date   • Breast cancer    • Diabetes mellitus     diet controlled   • Disease of thyroid gland    • GERD (gastroesophageal reflux disease)    • History of transfusion    • MI (myocardial infarction)    • Sleep apnea      Past Surgical History:   Procedure Laterality Date   • BACK SURGERY      multiple   • CHOLECYSTECTOMY     • COLONOSCOPY N/A 07/22/2022    Procedure: COLONOSCOPY;  Surgeon: Chacha Clark MD;  Location: [facility] ENDOSCOPY;  Service: Gastroenterology;  Laterality: N/A;   • DILATATION AND CURETTAGE      multiple   • ENDOSCOPY N/A 07/22/2022    Procedure: ESOPHAGOGASTRODUODENOSCOPY;  Surgeon: Chacha Clark MD;  Location: [facility] ENDOSCOPY;  Service: Gastroenterology;  Laterality: N/A;   • ERCP N/A 8/16/2022    Procedure: ENDOSCOPIC RETROGRADE CHOLANGIOPANCREATOGRAPHY;  Surgeon: Chacha Clark MD;  Location: [facility] ENDOSCOPY;  Service: Gastroenterology;  Laterality: N/A;   • GASTRIC BYPASS     • HYSTERECTOMY     • KNEE SURGERY Bilateral     multiple   • MASTECTOMY Bilateral    • NECK SURGERY      multiple   • RECTOCELE REPAIR      multiple     History reviewed. No pertinent family history.  OB History    No obstetric history on file.       Prior to Admission medications    Medication Sig Start Date End Date Taking? Authorizing Provider   aspirin 81 MG EC tablet Take 1 tablet by mouth Daily.   Yes Provider, MD Wiley   Betimol 0.25 %  ophthalmic solution Administer 1 drop to both eyes Daily. 2/10/23  Yes Wiley Majano MD   busPIRone (BUSPAR) 7.5 MG tablet Take 1 tablet by mouth 3 (Three) Times a Day. 7/19/22  Yes Wiley Majano MD   celecoxib (CeleBREX) 200 MG capsule Take 1 capsule by mouth Daily. 6/7/13  Yes Wiley Majano MD   cetirizine (zyrTEC) 10 MG tablet Take 1 tablet by mouth Daily. 6/7/13  Yes Wiley Majano MD   clonazePAM (KlonoPIN) 0.5 MG tablet Take 1 tablet by mouth Every Night. 11/21/18  Yes Wiley Majano MD   diphenhydrAMINE (BENADRYL) 25 mg capsule Take 1 capsule by mouth Every Night.   Yes Wiley Majano MD   DULoxetine (CYMBALTA) 60 MG capsule Take 1 capsule by mouth 2 (Two) Times a Day. 6/7/13  Yes Wiley Majano MD   levothyroxine (SYNTHROID, LEVOTHROID) 50 MCG tablet Take 1 tablet by mouth Daily.   Yes Wiley Majano MD   Linzess 290 MCG capsule capsule TAKE 1 CAPSULE BY MOUTH EVERY DAY IN THE MORNING BEFORE BREAKFAST 1/9/23  Yes Chacha Clark MD   lisinopril (PRINIVIL,ZESTRIL) 2.5 MG tablet Take 1 tablet by mouth Daily. 7/20/22  Yes Wiley Majano MD   Multiple Vitamins-Minerals (PreserVision AREDS 2+Multi Vit) capsule Take 1 capsule by mouth 2 (Two) Times a Day.   Yes Wiley Majano MD   naloxone (NARCAN) 4 MG/0.1ML nasal spray 1 spray into the nostril(s) as directed by provider As Needed. 1/11/23  Yes Wiley Majano MD   Omeprazole 20 MG tablet delayed-release Take 20 mg by mouth Daily. 6/7/13  Yes Wiley Majano MD   potassium chloride 10 MEQ CR tablet Take 1 tablet by mouth 2 (Two) Times a Day. 8/29/14  Yes Wiley Majano MD   Prenatal Multivit-Min-Fe-FA (Prenatal/Iron) tablet Take 1 tablet by mouth Daily.   Yes Wiley Majano MD   rizatriptan MLT (MAXALT-MLT) 10 MG disintegrating tablet Place 1 tablet on the tongue As Needed. 5/25/22  Yes Wiley Majano MD   traZODone (DESYREL) 150 MG tablet Take 1 tablet by  "mouth every night at bedtime. 5/25/22  Yes Provider, Wiley, MD   vitamin D3 125 MCG (5000 UT) capsule capsule Take 1 capsule by mouth Daily.   Yes Provider, MD Wiley     Allergies   Allergen Reactions   • Lansoprazole Hives   • Propoxyphene GI Intolerance   • Tape Rash   • Valium [Diazepam] Anxiety     Social History     Socioeconomic History   • Marital status:    Tobacco Use   • Smoking status: Never   • Smokeless tobacco: Never   Vaping Use   • Vaping Use: Never used   Substance and Sexual Activity   • Alcohol use: Never   • Drug use: Never   • Sexual activity: Defer       Review of Systems  Review of Systems   Constitutional: Negative for chills, fatigue, fever and unexpected weight change.   HENT: Negative for congestion, ear discharge, hearing loss, nosebleeds and sore throat.    Eyes: Negative for pain, discharge and redness.   Respiratory: Negative for cough, chest tightness, shortness of breath and wheezing.    Cardiovascular: Negative for chest pain and palpitations.   Gastrointestinal: Positive for abdominal pain. Negative for abdominal distention, blood in stool, constipation, diarrhea, nausea and vomiting.   Endocrine: Negative for cold intolerance, polydipsia, polyphagia and polyuria.   Genitourinary: Negative for dysuria, flank pain, frequency, hematuria and urgency.   Musculoskeletal: Negative for arthralgias, back pain, joint swelling and myalgias.   Skin: Negative for color change, pallor and rash.   Neurological: Negative for tremors, seizures, syncope, weakness and headaches.   Hematological: Negative for adenopathy. Does not bruise/bleed easily.   Psychiatric/Behavioral: Negative for behavioral problems, confusion, dysphoric mood, hallucinations and suicidal ideas. The patient is not nervous/anxious.         /54 (BP Location: Right arm)   Pulse 70   Ht 152.4 cm (60\")   Wt 57.8 kg (127 lb 6.4 oz)   BMI 24.88 kg/m²     Objective    Physical Exam  Constitutional:       " Appearance: She is well-developed.   HENT:      Head: Normocephalic and atraumatic.   Eyes:      Conjunctiva/sclera: Conjunctivae normal.      Pupils: Pupils are equal, round, and reactive to light.   Neck:      Thyroid: No thyromegaly.   Cardiovascular:      Rate and Rhythm: Normal rate and regular rhythm.      Heart sounds: Normal heart sounds. No murmur heard.  Pulmonary:      Effort: Pulmonary effort is normal.      Breath sounds: Normal breath sounds. No wheezing.   Abdominal:      General: Bowel sounds are normal. There is no distension.      Palpations: Abdomen is soft. There is no mass.      Tenderness: There is no abdominal tenderness.      Hernia: No hernia is present.   Genitourinary:     Comments: No lesions noted  Musculoskeletal:         General: No tenderness. Normal range of motion.      Cervical back: Normal range of motion and neck supple.   Lymphadenopathy:      Cervical: No cervical adenopathy.   Skin:     General: Skin is warm and dry.      Findings: No rash.   Neurological:      Mental Status: She is alert and oriented to person, place, and time.      Cranial Nerves: No cranial nerve deficit.   Psychiatric:         Thought Content: Thought content normal.       Clinical Support on 03/16/2023   Component Date Value Ref Range Status   • QT Interval 03/16/2023 400  ms Final   • QTC Interval 03/16/2023 419  ms Final     Assessment & Plan    No diagnosis found..   1.  Epigastric pain with nausea, likely due to biliary pathology.  Awaiting ERCP and endoscopic ultrasound.  2.  Constipation, well controlled.  Continue probiotics, high-fiber diet and Linzess.  3 diverticulosis, continue high-fiber diet.  4.  Elevated liver enzymes, improving.  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  Repeat LFTs in next visit.    Orders placed during this encounter include:  No orders of the defined types were placed in this encounter.      * Surgery not found *    Review and/or summary of lab tests, radiology, procedures,  medications. Review and summary of old records and obtaining of history. The risks and benefits of my recommendations, as well as other treatment options were discussed with the Patient today. Questions were answered.    No orders of the defined types were placed in this encounter.      Follow-up: Return in about 1 month (around 4/28/2023).               Results for orders placed or performed in visit on 03/16/23   ECG 12 Lead   Result Value Ref Range    QT Interval 400 ms    QTC Interval 419 ms   Results for orders placed or performed in visit on 03/16/23   CBC Auto Differential    Specimen: Blood   Result Value Ref Range    WBC 6.55 3.40 - 10.80 10*3/mm3    RBC 4.08 3.77 - 5.28 10*6/mm3    Hemoglobin 11.4 (L) 12.0 - 15.9 g/dL    Hematocrit 36.9 34.0 - 46.6 %    MCV 90.4 79.0 - 97.0 fL    MCH 27.9 26.6 - 33.0 pg    MCHC 30.9 (L) 31.5 - 35.7 g/dL    RDW 12.8 12.3 - 15.4 %    RDW-SD 41.5 37.0 - 54.0 fl    MPV 10.4 6.0 - 12.0 fL    Platelets 314 140 - 450 10*3/mm3    Neutrophil % 48.4 42.7 - 76.0 %    Lymphocyte % 37.6 19.6 - 45.3 %    Monocyte % 9.0 5.0 - 12.0 %    Eosinophil % 3.5 0.3 - 6.2 %    Basophil % 1.2 0.0 - 1.5 %    Immature Grans % 0.3 0.0 - 0.5 %    Neutrophils, Absolute 3.17 1.70 - 7.00 10*3/mm3    Lymphocytes, Absolute 2.46 0.70 - 3.10 10*3/mm3    Monocytes, Absolute 0.59 0.10 - 0.90 10*3/mm3    Eosinophils, Absolute 0.23 0.00 - 0.40 10*3/mm3    Basophils, Absolute 0.08 0.00 - 0.20 10*3/mm3    Immature Grans, Absolute 0.02 0.00 - 0.05 10*3/mm3    nRBC 0.0 0.0 - 0.2 /100 WBC   Comprehensive Metabolic Panel    Specimen: Blood   Result Value Ref Range    Glucose 117 (H) 65 - 99 mg/dL    BUN 12 8 - 23 mg/dL    Creatinine 0.85 0.57 - 1.00 mg/dL    Sodium 140 136 - 145 mmol/L    Potassium 4.7 3.5 - 5.2 mmol/L    Chloride 105 98 - 107 mmol/L    CO2 26.0 22.0 - 29.0 mmol/L    Calcium 8.5 (L) 8.6 - 10.5 mg/dL    Total Protein 7.1 6.0 - 8.5 g/dL    Albumin 3.7 3.5 - 5.2 g/dL    ALT (SGPT) 66 (H) 1 - 33 U/L    AST  (SGOT) 80 (H) 1 - 32 U/L    Alkaline Phosphatase 128 (H) 39 - 117 U/L    Total Bilirubin 0.5 0.0 - 1.2 mg/dL    Globulin 3.4 gm/dL    A/G Ratio 1.1 g/dL    BUN/Creatinine Ratio 14.1 7.0 - 25.0    Anion Gap 9.0 5.0 - 15.0 mmol/L    eGFR 72.9 >60.0 mL/min/1.73   Results for orders placed or performed in visit on 02/24/23   Hepatic Function Panel    Specimen: Blood   Result Value Ref Range    Total Protein 7.1 6.0 - 8.5 g/dL    Albumin 4.0 3.5 - 5.2 g/dL    ALT (SGPT) 34 (H) 1 - 33 U/L    AST (SGOT) 39 (H) 1 - 32 U/L    Alkaline Phosphatase 126 (H) 39 - 117 U/L    Total Bilirubin 0.5 0.0 - 1.2 mg/dL    Bilirubin, Direct <0.2 0.0 - 0.3 mg/dL    Bilirubin, Indirect     Results for orders placed or performed in visit on 08/23/22   Comprehensive Metabolic Panel    Specimen: Blood   Result Value Ref Range    Glucose 90 65 - 99 mg/dL    BUN 13 8 - 23 mg/dL    Creatinine 0.73 0.57 - 1.00 mg/dL    Sodium 140 136 - 145 mmol/L    Potassium 4.5 3.5 - 5.2 mmol/L    Chloride 104 98 - 107 mmol/L    CO2 26.2 22.0 - 29.0 mmol/L    Calcium 8.7 8.6 - 10.5 mg/dL    Total Protein 6.7 6.0 - 8.5 g/dL    Albumin 3.80 3.50 - 5.20 g/dL    ALT (SGPT) 58 (H) 1 - 33 U/L    AST (SGOT) 74 (H) 1 - 32 U/L    Alkaline Phosphatase 117 39 - 117 U/L    Total Bilirubin 0.7 0.0 - 1.2 mg/dL    Globulin 2.9 gm/dL    A/G Ratio 1.3 g/dL    BUN/Creatinine Ratio 17.8 7.0 - 25.0    Anion Gap 9.8 5.0 - 15.0 mmol/L    eGFR 88.0 >60.0 mL/min/1.73   Results for orders placed or performed during the hospital encounter of 08/16/22   POC Glucose Once    Specimen: Blood   Result Value Ref Range    Glucose 197 (H) 70 - 130 mg/dL   Results for orders placed or performed during the hospital encounter of 07/22/22   TISSUE EXAM, [facility] ([facility])    Specimen: A: Stomach; Tissue    B: Esophagus; Tissue    C: Large Intestine; Tissue   Result Value Ref Range    Reference Lab Report       [facility]  [address] [place]  [address]  Phone:  "[phone] or [phone]  Fax: [phone]  Spike Tracy M.D., Medical Director    PATIENT NAME                           LABORATORY NO.  [facility]                      [id number]  [id number]                         AGE              SEX  SSN           CLIENT REF #  [facility]           71      1950  F    [id number]   [id number]    [employer]                       REQUESTING M.D.     ATTENDING MRashidD.     COPY TO.  [address] [place] [address]                 DIAMOND LIRIANO ROBBIN  [address]  DATE COLLECTED      DATE RECEIVED      DATE REPORTED  2022    DIAGNOSIS:  A.   STOMACH, BIOPSY:  Reactive gastropathy  B.   GE JUNCTION:  Reactive gastric and squamous mucosa  Negative for specialized Robin's mucosa  C.   COLON, BIOPSY, MUCOSA:  No significant histologic  abnormality    JBS/sm    CLINICAL HISTORY:  Diarrhea, unspecified type, nausea, other constipation, hemorrhage of anus and  rectum    SPECIMENS RECEIVED:  A.  STOMACH, BIOPSY  B.  GE JUNCTION  C.  COLON, BIOPSY, MUCOSA    MICROSCOPIC DESCRIPTION:  Tissue blocks are prepared and slides are examined microscopically on all  specimens. See diagnosis for details.    Professional interpretation rendered by Nasim Richmond M.D. at [facility], [address] [place] [address].    GROSS DESCRIPTION:  A.  Specimen is received in 1 formalin filled container labeled \"stomach  biopsy\" and consists of 3 portions of tan soft tissue measuring 0.4 x 0.4 x  0.2 cm in aggregate.  Submitted entirely in 1 cassette.  MTH  B.  Specimen is received in 1 formalin filled container labeled \"EG junction  biopsy\" and consists of 2 portions of tan soft tissue measuring 0.4 x 0.3 x  0.2 cm in aggregate.  Submitted entirely in 1 cassette.  C.  Specimen is received in 1 formalin filled container  labeled \"colonic  mucosa biopsy\" and consists of " 3 portions of tan soft tissue measuring 0.5  x 0.4 x 0.2 cm in aggregate.  Submitted entirely in 1 cassette.    REVIEWED, DIAGNOSED AND ELECTRONICALLY  SIGNED BY:    Nasim Richmond M.D.  CPT CODES:  88305x3     Results for orders placed or performed during the hospital encounter of 07/12/22   COVID-19 and FLU A/B PCR - Swab, Nasopharynx    Specimen: Nasopharynx; Swab   Result Value Ref Range    COVID19 Not Detected Not Detected - Ref. Range    Influenza A PCR Not Detected Not Detected    Influenza B PCR Not Detected Not Detected   Urinalysis With Culture If Indicated - Urine, Clean Catch    Specimen: Urine, Clean Catch   Result Value Ref Range    Color, UA Yellow Yellow, Straw, Dark Yellow, Eleanor    Appearance, UA Clear Clear    pH, UA 7.5 5.0 - 9.0    Specific [place], UA 1.014 1.003 - 1.030    Glucose, UA Negative Negative    Ketones, UA 15 mg/dL (1+) (A) Negative    Bilirubin, UA Negative Negative    Blood, UA Negative Negative    Protein, UA Negative Negative    Leuk Esterase, UA Negative Negative    Nitrite, UA Negative Negative    Urobilinogen, UA 1.0 E.U./dL 0.2 - 1.0 E.U./dL   CBC Auto Differential    Specimen: Blood   Result Value Ref Range    WBC 8.02 3.40 - 10.80 10*3/mm3    RBC 4.41 3.77 - 5.28 10*6/mm3    Hemoglobin 12.7 12.0 - 15.9 g/dL    Hematocrit 36.4 34.0 - 46.6 %    MCV 82.5 79.0 - 97.0 fL    MCH 28.8 26.6 - 33.0 pg    MCHC 34.9 31.5 - 35.7 g/dL    RDW 12.0 (L) 12.3 - 15.4 %    RDW-SD 35.9 (L) 37.0 - 54.0 fl    MPV 9.8 6.0 - 12.0 fL    Platelets 269 140 - 450 10*3/mm3    Neutrophil % 60.0 42.7 - 76.0 %    Lymphocyte % 30.0 19.6 - 45.3 %    Monocyte % 8.7 5.0 - 12.0 %    Eosinophil % 0.1 (L) 0.3 - 6.2 %    Basophil % 1.0 0.0 - 1.5 %    Immature Grans % 0.2 0.0 - 0.5 %    Neutrophils, Absolute 4.80 1.70 - 7.00 10*3/mm3    Lymphocytes, Absolute 2.41 0.70 - 3.10 10*3/mm3    Monocytes, Absolute 0.70 0.10 - 0.90 10*3/mm3    Eosinophils, Absolute 0.01 0.00 - 0.40 10*3/mm3    Basophils, Absolute 0.08  0.00 - 0.20 10*3/mm3    Immature Grans, Absolute 0.02 0.00 - 0.05 10*3/mm3    nRBC 0.0 0.0 - 0.2 /100 WBC     *Note: Due to a large number of results and/or encounters for the requested time period, some results have not been displayed. A complete set of results can be found in Results Review.         This document has been electronically signed by Chacha Clark MD on April 16, 2023 18:52 CDT

## 2023-06-17 ENCOUNTER — APPOINTMENT (OUTPATIENT)
Dept: CT IMAGING | Facility: HOSPITAL | Age: 73
End: 2023-06-17
Payer: MEDICARE

## 2023-06-17 ENCOUNTER — HOSPITAL ENCOUNTER (EMERGENCY)
Facility: HOSPITAL | Age: 73
Discharge: HOME | End: 2023-06-17
Attending: EMERGENCY MEDICINE
Payer: MEDICARE

## 2023-06-17 VITALS
RESPIRATION RATE: 20 BRPM | HEIGHT: 60 IN | DIASTOLIC BLOOD PRESSURE: 84 MMHG | TEMPERATURE: 98.2 F | SYSTOLIC BLOOD PRESSURE: 177 MMHG | BODY MASS INDEX: 24.94 KG/M2 | OXYGEN SATURATION: 98 % | WEIGHT: 127 LBS | HEART RATE: 76 BPM

## 2023-06-17 DIAGNOSIS — K59.00 CONSTIPATION, UNSPECIFIED CONSTIPATION TYPE: ICD-10-CM

## 2023-06-17 DIAGNOSIS — R11.2 NAUSEA AND VOMITING, UNSPECIFIED VOMITING TYPE: ICD-10-CM

## 2023-06-17 DIAGNOSIS — R10.10 UPPER ABDOMINAL PAIN: Primary | ICD-10-CM

## 2023-06-17 LAB
ALBUMIN SERPL-MCNC: 4.1 G/DL (ref 3.5–5.2)
ALBUMIN/GLOB SERPL: 1.1 G/DL
ALP SERPL-CCNC: 133 U/L (ref 39–117)
ALT SERPL W P-5'-P-CCNC: 58 U/L (ref 1–33)
ANION GAP SERPL CALCULATED.3IONS-SCNC: 16 MMOL/L (ref 5–15)
AST SERPL-CCNC: 57 U/L (ref 1–32)
BASOPHILS # BLD AUTO: 0.12 10*3/MM3 (ref 0–0.2)
BASOPHILS NFR BLD AUTO: 1.2 % (ref 0–1.5)
BILIRUB SERPL-MCNC: 0.8 MG/DL (ref 0–1.2)
BUN SERPL-MCNC: 16 MG/DL (ref 8–23)
BUN/CREAT SERPL: 20 (ref 7–25)
CALCIUM SPEC-SCNC: 9.4 MG/DL (ref 8.6–10.5)
CHLORIDE SERPL-SCNC: 104 MMOL/L (ref 98–107)
CO2 SERPL-SCNC: 16 MMOL/L (ref 22–29)
CREAT SERPL-MCNC: 0.8 MG/DL (ref 0.57–1)
DEPRECATED RDW RBC AUTO: 38.1 FL (ref 37–54)
EGFRCR SERPLBLD CKD-EPI 2021: 78.4 ML/MIN/1.73
EOSINOPHIL # BLD AUTO: 0.04 10*3/MM3 (ref 0–0.4)
EOSINOPHIL NFR BLD AUTO: 0.4 % (ref 0.3–6.2)
ERYTHROCYTE [DISTWIDTH] IN BLOOD BY AUTOMATED COUNT: 12.8 % (ref 12.3–15.4)
GLOBULIN UR ELPH-MCNC: 3.8 GM/DL
GLUCOSE SERPL-MCNC: 222 MG/DL (ref 65–99)
HCT VFR BLD AUTO: 38 % (ref 34–46.6)
HGB BLD-MCNC: 12.9 G/DL (ref 12–15.9)
HOLD SPECIMEN: NORMAL
IMM GRANULOCYTES # BLD AUTO: 0.02 10*3/MM3 (ref 0–0.05)
IMM GRANULOCYTES NFR BLD AUTO: 0.2 % (ref 0–0.5)
LIPASE SERPL-CCNC: 58 U/L (ref 13–60)
LYMPHOCYTES # BLD AUTO: 3.03 10*3/MM3 (ref 0.7–3.1)
LYMPHOCYTES NFR BLD AUTO: 31.1 % (ref 19.6–45.3)
MCH RBC QN AUTO: 27.9 PG (ref 26.6–33)
MCHC RBC AUTO-ENTMCNC: 33.9 G/DL (ref 31.5–35.7)
MCV RBC AUTO: 82.3 FL (ref 79–97)
MONOCYTES # BLD AUTO: 0.73 10*3/MM3 (ref 0.1–0.9)
MONOCYTES NFR BLD AUTO: 7.5 % (ref 5–12)
NEUTROPHILS NFR BLD AUTO: 5.8 10*3/MM3 (ref 1.7–7)
NEUTROPHILS NFR BLD AUTO: 59.6 % (ref 42.7–76)
NRBC BLD AUTO-RTO: 0 /100 WBC (ref 0–0.2)
PLATELET # BLD AUTO: 284 10*3/MM3 (ref 140–450)
PMV BLD AUTO: 10.1 FL (ref 6–12)
POTASSIUM SERPL-SCNC: 3.8 MMOL/L (ref 3.5–5.2)
PROT SERPL-MCNC: 7.9 G/DL (ref 6–8.5)
RBC # BLD AUTO: 4.62 10*6/MM3 (ref 3.77–5.28)
SODIUM SERPL-SCNC: 136 MMOL/L (ref 136–145)
WBC NRBC COR # BLD: 9.74 10*3/MM3 (ref 3.4–10.8)
WHOLE BLOOD HOLD COAG: NORMAL
WHOLE BLOOD HOLD SPECIMEN: NORMAL

## 2023-06-17 PROCEDURE — 25510000001 IOPAMIDOL 61 % SOLUTION: Performed by: EMERGENCY MEDICINE

## 2023-06-17 PROCEDURE — 74177 CT ABD & PELVIS W/CONTRAST: CPT

## 2023-06-17 PROCEDURE — 25010000002 HYDROMORPHONE 1 MG/ML SOLUTION: Performed by: EMERGENCY MEDICINE

## 2023-06-17 PROCEDURE — 25010000002 HYDRALAZINE PER 20 MG: Performed by: EMERGENCY MEDICINE

## 2023-06-17 PROCEDURE — 83690 ASSAY OF LIPASE: CPT

## 2023-06-17 PROCEDURE — 85025 COMPLETE CBC W/AUTO DIFF WBC: CPT

## 2023-06-17 PROCEDURE — 80053 COMPREHEN METABOLIC PANEL: CPT | Performed by: EMERGENCY MEDICINE

## 2023-06-17 PROCEDURE — 25010000002 PROCHLORPERAZINE 10 MG/2ML SOLUTION: Performed by: EMERGENCY MEDICINE

## 2023-06-17 RX ORDER — SODIUM CHLORIDE 0.9 % (FLUSH) 0.9 %
10 SYRINGE (ML) INJECTION AS NEEDED
Status: DISCONTINUED | OUTPATIENT
Start: 2023-06-17 | End: 2023-06-18 | Stop reason: HOSPADM

## 2023-06-17 RX ORDER — PROCHLORPERAZINE MALEATE 5 MG/1
5 TABLET ORAL EVERY 6 HOURS PRN
Qty: 20 TABLET | Refills: 0 | Status: SHIPPED | OUTPATIENT
Start: 2023-06-17

## 2023-06-17 RX ORDER — HYDRALAZINE HYDROCHLORIDE 20 MG/ML
10 INJECTION INTRAMUSCULAR; INTRAVENOUS ONCE
Status: COMPLETED | OUTPATIENT
Start: 2023-06-17 | End: 2023-06-17

## 2023-06-17 RX ORDER — LACTULOSE 10 G/15ML
10 SOLUTION ORAL 2 TIMES DAILY PRN
Qty: 150 ML | Refills: 0 | Status: SHIPPED | OUTPATIENT
Start: 2023-06-17

## 2023-06-17 RX ORDER — PROCHLORPERAZINE EDISYLATE 5 MG/ML
10 INJECTION INTRAMUSCULAR; INTRAVENOUS ONCE
Status: COMPLETED | OUTPATIENT
Start: 2023-06-17 | End: 2023-06-17

## 2023-06-17 RX ADMIN — PROCHLORPERAZINE EDISYLATE 10 MG: 5 INJECTION INTRAMUSCULAR; INTRAVENOUS at 22:55

## 2023-06-17 RX ADMIN — IOPAMIDOL 90 ML: 612 INJECTION, SOLUTION INTRAVENOUS at 21:43

## 2023-06-17 RX ADMIN — HYDRALAZINE HYDROCHLORIDE 10 MG: 20 INJECTION INTRAMUSCULAR; INTRAVENOUS at 22:03

## 2023-06-17 RX ADMIN — HYDROMORPHONE HYDROCHLORIDE 1 MG: 1 INJECTION, SOLUTION INTRAMUSCULAR; INTRAVENOUS; SUBCUTANEOUS at 20:52

## 2023-06-17 RX ADMIN — PROCHLORPERAZINE EDISYLATE 10 MG: 5 INJECTION INTRAMUSCULAR; INTRAVENOUS at 20:52

## 2023-06-17 RX ADMIN — SODIUM CHLORIDE 1000 ML: 9 INJECTION, SOLUTION INTRAVENOUS at 20:51

## 2023-06-18 LAB — HOLD SPECIMEN: NORMAL

## 2023-06-18 NOTE — DISCHARGE INSTRUCTIONS
Please return with new or worsening symptoms.  Follow-up with GI as discussed.  Medication has been sent to the pharmacy for nausea and for constipation.  Take as directed.

## 2023-06-18 NOTE — ED PROVIDER NOTES
Subjective   History of Present Illness  72-year-old female presents to the emergency department with complaint of upper abdominal pain and vomiting.  She has had some undiagnosed GI issues going on for quite some time but she reports this is different.  Reports that she cannot sit still because of the cramping in her upper abdomen.  Vomiting began just prior to her calling EMS.  Denies any fevers or chills.  Reports she has not been eating or drinking much over the last couple of days because of symptoms.    Family history, surgical history, social history, current medications and allergies are reviewed with the patient and triage documentation and vitals are reviewed.     History provided by:  Patient, medical records and relative   used: No      Review of Systems   Constitutional:  Negative for chills and fever.   HENT:  Negative for congestion and sore throat.    Eyes:  Negative for photophobia and visual disturbance.   Respiratory:  Negative for cough, shortness of breath and wheezing.    Cardiovascular:  Negative for chest pain, palpitations and leg swelling.   Gastrointestinal:  Positive for abdominal pain, nausea and vomiting. Negative for abdominal distention, constipation and diarrhea.   Endocrine: Negative for polydipsia, polyphagia and polyuria.   Genitourinary:  Negative for dysuria, frequency and urgency.   Musculoskeletal:  Negative for arthralgias, back pain, myalgias and neck pain.   Skin:  Negative for rash and wound.   Allergic/Immunologic: Negative.    Neurological:  Negative for weakness, light-headedness and numbness.   Hematological: Negative.    Psychiatric/Behavioral: Negative.       Past Medical History:   Diagnosis Date    Breast cancer     Diabetes mellitus     diet controlled    Disease of thyroid gland     GERD (gastroesophageal reflux disease)     History of transfusion     MI (myocardial infarction)     Sleep apnea        Allergies   Allergen Reactions     Lansoprazole Hives    Propoxyphene GI Intolerance    Tape Rash    Valium [Diazepam] Anxiety       Past Surgical History:   Procedure Laterality Date    BACK SURGERY      multiple    CHOLECYSTECTOMY      COLONOSCOPY N/A 07/22/2022    Procedure: COLONOSCOPY;  Surgeon: Chacha Clark MD;  Location: [facility] ENDOSCOPY;  Service: Gastroenterology;  Laterality: N/A;    DILATATION AND CURETTAGE      multiple    ENDOSCOPY N/A 07/22/2022    Procedure: ESOPHAGOGASTRODUODENOSCOPY;  Surgeon: Chacha Clark MD;  Location: [facility] ENDOSCOPY;  Service: Gastroenterology;  Laterality: N/A;    ERCP N/A 8/16/2022    Procedure: ENDOSCOPIC RETROGRADE CHOLANGIOPANCREATOGRAPHY;  Surgeon: Chacha Clark MD;  Location: [facility] ENDOSCOPY;  Service: Gastroenterology;  Laterality: N/A;    GASTRIC BYPASS      HYSTERECTOMY      KNEE SURGERY Bilateral     multiple    MASTECTOMY Bilateral     NECK SURGERY      multiple    RECTOCELE REPAIR      multiple       No family history on file.    Social History     Socioeconomic History    Marital status:    Tobacco Use    Smoking status: Never    Smokeless tobacco: Never   Vaping Use    Vaping Use: Never used   Substance and Sexual Activity    Alcohol use: Never    Drug use: Never    Sexual activity: Defer           Objective   Physical Exam  Vitals and nursing note reviewed.   Constitutional:       General: She is not in acute distress.     Appearance: She is well-developed and normal weight. She is not ill-appearing, toxic-appearing or diaphoretic.   HENT:      Head: Normocephalic.      Mouth/Throat:      Mouth: Mucous membranes are moist.   Eyes:      General: No scleral icterus.     Pupils: Pupils are equal, round, and reactive to light.   Cardiovascular:      Rate and Rhythm: Normal rate and regular rhythm.      Heart sounds: No murmur heard.  Pulmonary:      Effort: Pulmonary effort is normal. No respiratory distress.      Breath sounds: Normal breath sounds. No wheezing or rhonchi.    Abdominal:      General: Bowel sounds are decreased. There is no distension.      Palpations: Abdomen is soft. There is no hepatomegaly or splenomegaly.      Tenderness: There is no abdominal tenderness. There is no guarding or rebound. Negative signs include Silver's sign and McBurney's sign.      Hernia: No hernia is present.   Skin:     General: Skin is warm and dry.      Capillary Refill: Capillary refill takes less than 2 seconds.   Neurological:      General: No focal deficit present.      Mental Status: She is alert and oriented to person, place, and time.   Psychiatric:         Mood and Affect: Mood is anxious.         Behavior: Behavior normal.       Procedures  none         ED Course      Labs Reviewed   COMPREHENSIVE METABOLIC PANEL - Abnormal; Notable for the following components:       Result Value    Glucose 222 (*)     CO2 16.0 (*)     ALT (SGPT) 58 (*)     AST (SGOT) 57 (*)     Alkaline Phosphatase 133 (*)     Anion Gap 16.0 (*)     All other components within normal limits    Narrative:     GFR Normal >60  Chronic Kidney Disease <60  Kidney Failure <15    The GFR formula is only valid for adults with stable renal function between ages 18 and 70.   LIPASE - Normal   CBC WITH AUTO DIFFERENTIAL - Normal   CBC AND DIFFERENTIAL    Narrative:     The following orders were created for panel order CBC & Differential.  Procedure                               Abnormality         Status                     ---------                               -----------         ------                     CBC Auto Differential[[id number]]        Normal              Final result                 Please view results for these tests on the individual orders.   LAVENDER TOP   GOLD TOP - SST   LIGHT BLUE TOP   EXTRA TUBES    Narrative:     The following orders were created for panel order Extra Tubes.  Procedure                               Abnormality         Status                     ---------                                -----------         ------                     Lavender Top[[id number]]                                     Final result               Gold Top - SST[[id number]]                                   Final result               Gray Top[[id number]]                                         In process                 Light Blue Top[[id number]]                                   Final result                 Please view results for these tests on the individual orders.   GRAY TOP     CT Abdomen Pelvis With Contrast    Result Date: 6/17/2023  Narrative: INDICATION: upper abd pain and vomiting. COMPARISON: Abdomen pelvis CT 7/13/2022. TECHNIQUE: Helical CT of the abdomen and pelvis was performed with intravenous contrast. Multiplanar reformations were provided. FINDINGS: Unchanged extensive postoperative changes of the stomach and adjacent small bowel. Moderate stool burden throughout the colon with no evidence of bowel inflammation, obstruction, or appendicitis. Spleen, gallbladder, and uterus are absent. Multiple nonobstructing bilateral nephroliths. Liver, pancreas, adrenals, and urinary bladder appear normal. Moderate atherosclerosis. No free fluid or lymphadenopathy. Lumbar hardware and spinal stimulator noted.     Impression: No acute findings in the abdomen or pelvis. Postoperative changes including extensive postoperative changes of the stomach and adjacent small bowel.         Medical Decision Making  Problems Addressed:  Constipation, unspecified constipation type: complicated acute illness or injury  Nausea and vomiting, unspecified vomiting type: complicated acute illness or injury  Upper abdominal pain: complicated acute illness or injury    Amount and/or Complexity of Data Reviewed  Labs: ordered. Decision-making details documented in ED Course.  Radiology: ordered. Decision-making details documented in ED Course.    Risk  Prescription drug management.  Parenteral controlled substances.  Decision regarding  hospitalization.    CT imaging reveals no new or acute changes to explain patient's symptoms.  There are postoperative changes.  She does have significant constipation which could be contributory but no obstructive process.  Laboratory studies show improvement in her previous LFTs with no acute abnormality and no evidence of infectious process.  Blood pressure improved with treatment in the emergency department.  She is still feeling very fatigued.  She has canceled her follow-up appointment with GI next week.  We discussed that she needs to continue follow-up as their work-up needs to be completed and furthered as her symptoms or not better.  Patient and family knowledge understanding.  I prescribed lactulose to help with constipation since she states the Linzess did not help and she is no longer taking it.  Also prescribed Compazine for nausea.  They acknowledge understanding of treatment and plan and agreeable to discharge with outpatient management follow-up.    Final diagnoses:   Upper abdominal pain   Nausea and vomiting, unspecified vomiting type   Constipation, unspecified constipation type        ED Disposition  ED Disposition       ED Disposition   Discharge    Condition   Stable    Comment   --               Morris Chicas, APRN  [address] [id number] [phone]               Medication List        New Prescriptions      lactulose 10 GM/15ML solution  Commonly known as: CHRONULAC  Take 15 mL by mouth 2 (Two) Times a Day As Needed (constipation).     prochlorperazine 5 MG tablet  Commonly known as: COMPAZINE  Take 1 tablet by mouth Every 6 (Six) Hours As Needed for Nausea or Vomiting.               Where to Get Your Medications        These medications were sent to [facility]/pharmacy #[id number] - [place], [place] - [address] [facility] - [phone]  - [phone]   [address] [place] [address]      Phone: [phone]   lactulose 10 GM/15ML solution  prochlorperazine 5 MG  tablet            Musa Spicer, DO  06/17/23 3249

## 2023-08-24 ENCOUNTER — OFFICE VISIT (OUTPATIENT)
Dept: OTOLARYNGOLOGY | Facility: CLINIC | Age: 73
End: 2023-08-24
Payer: MEDICARE

## 2023-08-24 VITALS — HEIGHT: 60 IN | BODY MASS INDEX: 25.4 KG/M2 | WEIGHT: 129.4 LBS

## 2023-08-24 DIAGNOSIS — J37.0 CHRONIC LARYNGITIS: ICD-10-CM

## 2023-08-24 DIAGNOSIS — J31.0 CHRONIC RHINITIS: Primary | ICD-10-CM

## 2023-08-24 PROCEDURE — 31575 DIAGNOSTIC LARYNGOSCOPY: CPT | Performed by: OTOLARYNGOLOGY

## 2023-08-24 PROCEDURE — 1160F RVW MEDS BY RX/DR IN RCRD: CPT | Performed by: OTOLARYNGOLOGY

## 2023-08-24 PROCEDURE — 99203 OFFICE O/P NEW LOW 30 MIN: CPT | Performed by: OTOLARYNGOLOGY

## 2023-08-24 PROCEDURE — 1159F MED LIST DOCD IN RCRD: CPT | Performed by: OTOLARYNGOLOGY

## 2023-08-24 RX ORDER — GUAIFENESIN 600 MG/1
600 TABLET, EXTENDED RELEASE ORAL 2 TIMES DAILY
Qty: 60 TABLET | Refills: 5 | Status: SHIPPED | OUTPATIENT
Start: 2023-08-24

## 2023-08-28 NOTE — PROGRESS NOTES
Subjective   Ariana Doshi is a 72 y.o. female.       History of Present Illness   Patient reports that she has had some hoarseness and coughs up mucus at night.  Has a history of previous septoplasty tonsillectomy and uvulopalatopharyngoplasty.  Takes omeprazole for acid reflux and reports no breakthrough symptoms.  Has some rhinorrhea at night.      The following portions of the patient's history were reviewed and updated as appropriate: allergies, current medications, past family history, past medical history, past social history, past surgical history and problem list.     reports that she has never smoked. She has never used smokeless tobacco. She reports that she does not drink alcohol and does not use drugs.   Patient is not a tobacco user and has not been counseled for use of tobacco products      Review of Systems        Objective   Physical Exam    Ears: External ears no deformity, canals no discharge, tympanic membranes intact clear and mobile bilaterally.  Nares boggy mucosa septum straight no discharge or purulence  Oral cavity no masses or lesions  Pharynx: Post uvulopalatoplasty appearance no erythema or mass  Neck no adenopathy or massProcedure Note    Pre-operative Diagnosis: Patient presents with:  Sinus Problem      Post-operative Diagnosis: Chronic laryngitis    Anesthesia: Topical with Xylocaine and Miki-Synephrine    Endoscopy Type:  Flexible Laryngoscopy    Procedure Details:    The patient was placed in the sitting position.  After topical anesthesia and decongestion, the 4 mm laryngoscope was passed.  The nasal cavities, nasopharynx, oropharynx, hypopharynx, and larynx were all examined.  Vocal cords were examined during respiration and phonation.  The following findings were noted:    Findings: Previously noted nasal findings were confirmed. Nasopharynx without mass, hypopharynx and larynx without evidence of neoplasm. Vocal cord mobility intact. There is chronic appearing edema and  erythema of the laryngeal structures consistent with chronic laryngitis.    Condition:  Stable.  Patient tolerated procedure well.    Complications:  None       Assessment and Plan   Diagnoses and all orders for this visit:    1. Chronic rhinitis (Primary)    2. Chronic laryngitis    Other orders  -     guaiFENesin (Mucinex) 600 MG 12 hr tablet; Take 1 tablet by mouth 2 (Two) Times a Day.  Dispense: 60 tablet; Refill: 5               Plan: Add Flonase 2 sprays each nostril daily.  Mucinex twice daily.  Nasal saline spray as needed.  Consider coolmist vaporizer at bedside.  Return 3 months.  Call for problems.

## 2023-09-14 ENCOUNTER — HOSPITAL ENCOUNTER (EMERGENCY)
Facility: HOSPITAL | Age: 73
Discharge: HOME | End: 2023-09-15
Attending: STUDENT IN AN ORGANIZED HEALTH CARE EDUCATION/TRAINING PROGRAM
Payer: MEDICARE

## 2023-09-14 DIAGNOSIS — R11.0 NAUSEA: Primary | ICD-10-CM

## 2023-09-14 PROCEDURE — 63710000001 ONDANSETRON ODT 4 MG TABLET DISPERSIBLE: Performed by: STUDENT IN AN ORGANIZED HEALTH CARE EDUCATION/TRAINING PROGRAM

## 2023-09-14 PROCEDURE — 63710000001 PROMETHAZINE PER 12.5 MG: Performed by: STUDENT IN AN ORGANIZED HEALTH CARE EDUCATION/TRAINING PROGRAM

## 2023-09-14 PROCEDURE — 99283 EMERGENCY DEPT VISIT LOW MDM: CPT

## 2023-09-14 RX ORDER — ONDANSETRON 4 MG/1
4 TABLET, ORALLY DISINTEGRATING ORAL ONCE
Status: COMPLETED | OUTPATIENT
Start: 2023-09-14 | End: 2023-09-14

## 2023-09-14 RX ORDER — PROMETHAZINE HYDROCHLORIDE 12.5 MG/1
12.5 TABLET ORAL ONCE
Status: COMPLETED | OUTPATIENT
Start: 2023-09-14 | End: 2023-09-14

## 2023-09-14 RX ADMIN — ONDANSETRON 4 MG: 4 TABLET, ORALLY DISINTEGRATING ORAL at 23:43

## 2023-09-14 RX ADMIN — PROMETHAZINE HYDROCHLORIDE 12.5 MG: 12.5 TABLET ORAL at 23:43

## 2023-09-15 VITALS
HEIGHT: 60 IN | SYSTOLIC BLOOD PRESSURE: 153 MMHG | BODY MASS INDEX: 25.32 KG/M2 | DIASTOLIC BLOOD PRESSURE: 68 MMHG | TEMPERATURE: 97.5 F | OXYGEN SATURATION: 98 % | RESPIRATION RATE: 18 BRPM | HEART RATE: 98 BPM | WEIGHT: 129 LBS

## 2023-09-15 LAB — GLUCOSE BLDC GLUCOMTR-MCNC: 191 MG/DL (ref 70–130)

## 2023-09-15 PROCEDURE — 82948 REAGENT STRIP/BLOOD GLUCOSE: CPT

## 2023-09-15 PROCEDURE — 96374 THER/PROPH/DIAG INJ IV PUSH: CPT

## 2023-09-15 PROCEDURE — 25010000002 LORAZEPAM PER 2 MG: Performed by: STUDENT IN AN ORGANIZED HEALTH CARE EDUCATION/TRAINING PROGRAM

## 2023-09-15 PROCEDURE — 25010000002 HYDRALAZINE PER 20 MG: Performed by: STUDENT IN AN ORGANIZED HEALTH CARE EDUCATION/TRAINING PROGRAM

## 2023-09-15 PROCEDURE — 96375 TX/PRO/DX INJ NEW DRUG ADDON: CPT

## 2023-09-15 PROCEDURE — 25010000002 PROCHLORPERAZINE 10 MG/2ML SOLUTION: Performed by: STUDENT IN AN ORGANIZED HEALTH CARE EDUCATION/TRAINING PROGRAM

## 2023-09-15 RX ORDER — PROCHLORPERAZINE MALEATE 10 MG
10 TABLET ORAL ONCE
Status: DISCONTINUED | OUTPATIENT
Start: 2023-09-15 | End: 2023-09-15

## 2023-09-15 RX ORDER — ONDANSETRON 4 MG/1
4 TABLET, ORALLY DISINTEGRATING ORAL 4 TIMES DAILY PRN
Qty: 12 TABLET | Refills: 0 | Status: SHIPPED | OUTPATIENT
Start: 2023-09-15

## 2023-09-15 RX ORDER — LORAZEPAM 2 MG/ML
1 INJECTION INTRAMUSCULAR ONCE
Status: COMPLETED | OUTPATIENT
Start: 2023-09-15 | End: 2023-09-15

## 2023-09-15 RX ORDER — HYDRALAZINE HYDROCHLORIDE 50 MG/1
50 TABLET, FILM COATED ORAL ONCE
Status: DISCONTINUED | OUTPATIENT
Start: 2023-09-15 | End: 2023-09-15

## 2023-09-15 RX ORDER — PROCHLORPERAZINE EDISYLATE 5 MG/ML
5 INJECTION INTRAMUSCULAR; INTRAVENOUS ONCE
Status: COMPLETED | OUTPATIENT
Start: 2023-09-15 | End: 2023-09-15

## 2023-09-15 RX ORDER — PROMETHAZINE HYDROCHLORIDE 25 MG/1
25 TABLET ORAL EVERY 6 HOURS PRN
Qty: 12 TABLET | Refills: 0 | Status: SHIPPED | OUTPATIENT
Start: 2023-09-15

## 2023-09-15 RX ORDER — LORAZEPAM 2 MG/1
2 TABLET ORAL ONCE
Status: DISCONTINUED | OUTPATIENT
Start: 2023-09-15 | End: 2023-09-15

## 2023-09-15 RX ORDER — HYDRALAZINE HYDROCHLORIDE 20 MG/ML
20 INJECTION INTRAMUSCULAR; INTRAVENOUS ONCE
Status: COMPLETED | OUTPATIENT
Start: 2023-09-15 | End: 2023-09-15

## 2023-09-15 RX ADMIN — LORAZEPAM 1 MG: 2 INJECTION INTRAMUSCULAR; INTRAVENOUS at 01:33

## 2023-09-15 RX ADMIN — PROCHLORPERAZINE EDISYLATE 5 MG: 5 INJECTION INTRAMUSCULAR; INTRAVENOUS at 01:32

## 2023-09-15 RX ADMIN — HYDRALAZINE HYDROCHLORIDE 20 MG: 20 INJECTION INTRAMUSCULAR; INTRAVENOUS at 01:33

## 2023-09-15 NOTE — ED PROVIDER NOTES
Subjective   History of Present Illness  72-year-old female comes to the ER with a 4-hour history of nausea.  She denies vomiting or diarrhea.  No abdominal pain.  She tried taking Compazine at home for the nausea but did not help.  Patient states Zofran does not help in general.  She denies other symptoms.    History provided by:  Patient   used: No      Review of Systems   Constitutional:  Negative for activity change, appetite change, chills and fever.   HENT:  Negative for drooling.    Eyes:  Negative for redness.   Respiratory:  Negative for cough, chest tightness, shortness of breath and wheezing.    Cardiovascular:  Negative for chest pain and palpitations.   Gastrointestinal:  Positive for nausea. Negative for abdominal pain, constipation, diarrhea and vomiting.   Genitourinary:  Negative for flank pain.   Skin:  Negative for color change.   Neurological:  Negative for seizures.   Psychiatric/Behavioral:  Positive for sleep disturbance. Negative for confusion.      Past Medical History:   Diagnosis Date    Breast cancer     Diabetes mellitus     diet controlled    Disease of thyroid gland     GERD (gastroesophageal reflux disease)     History of transfusion     MI (myocardial infarction)     Sleep apnea        Allergies   Allergen Reactions    Lansoprazole Hives    Propoxyphene GI Intolerance    Tape Rash    Valium [Diazepam] Anxiety       Past Surgical History:   Procedure Laterality Date    BACK SURGERY      multiple    CHOLECYSTECTOMY      COLONOSCOPY N/A 07/22/2022    Procedure: COLONOSCOPY;  Surgeon: Chacha Clark MD;  Location: [facility] ENDOSCOPY;  Service: Gastroenterology;  Laterality: N/A;    DILATATION AND CURETTAGE      multiple    ENDOSCOPY N/A 07/22/2022    Procedure: ESOPHAGOGASTRODUODENOSCOPY;  Surgeon: Chacha Clark MD;  Location: [facility] ENDOSCOPY;  Service: Gastroenterology;  Laterality: N/A;    ERCP N/A 8/16/2022    Procedure: ENDOSCOPIC RETROGRADE  "CHOLANGIOPANCREATOGRAPHY;  Surgeon: Chacha Clark MD;  Location: [facility] ENDOSCOPY;  Service: Gastroenterology;  Laterality: N/A;    GASTRIC BYPASS      HYSTERECTOMY      KNEE SURGERY Bilateral     multiple    MASTECTOMY Bilateral     NECK SURGERY      multiple    RECTOCELE REPAIR      multiple       Family History   Problem Relation Age of Onset    Thyroid disease Other     Diabetes Other     Cancer Other     Heart disease Other        Social History     Socioeconomic History    Marital status:    Tobacco Use    Smoking status: Never    Smokeless tobacco: Never   Vaping Use    Vaping Use: Never used   Substance and Sexual Activity    Alcohol use: Never    Drug use: Never    Sexual activity: Defer           Objective   Vitals:    09/14/23 2308 09/14/23 2345   BP: 163/95    Patient Position: Sitting    Pulse: 58    Resp: 18    Temp:  97.5 °F (36.4 °C)   TempSrc:  Oral   SpO2: 100%    Weight: 58.5 kg (129 lb)    Height: 152.4 cm (60\")        Physical Exam  Vitals and nursing note reviewed.   Constitutional:       General: She is not in acute distress.     Appearance: She is well-developed. She is ill-appearing. She is not toxic-appearing or diaphoretic.   HENT:      Head: Normocephalic.   Eyes:      Conjunctiva/sclera: Conjunctivae normal.   Pulmonary:      Effort: Pulmonary effort is normal. No accessory muscle usage or respiratory distress.   Chest:      Chest wall: No tenderness.   Abdominal:      Palpations: Abdomen is soft.      Tenderness: There is no abdominal tenderness (deep palpation). There is no right CVA tenderness, left CVA tenderness, guarding or rebound.   Skin:     General: Skin is warm and dry.      Capillary Refill: Capillary refill takes less than 2 seconds.   Neurological:      Mental Status: She is alert and oriented to person, place, and time.       Procedures           ED Course                Medical Decision Making  Vital signs are stable, afebrile.  Blood sugar is 190.  Patient is " asymptomatic other than feeling nauseated for a few hours.  She received antiemetics.  No vomiting in the ER. No abd pain or other symptoms.  Recommend follow-up with her PCP.  Return precautions given.  Antiemetics called into her pharmacy.    Problems Addressed:  Nausea: complicated acute illness or injury    Amount and/or Complexity of Data Reviewed  Labs: ordered.    Risk  Prescription drug management.        Final diagnoses:   Nausea       ED Disposition  ED Disposition       ED Disposition   Discharge    Condition   Stable    Comment   --               Morris Chicas, APRN  [address] [facility] [facility] [place] [address] [phone]    Schedule an appointment as soon as possible for a visit in 2 days  As needed, ER follow up         Medication List        New Prescriptions      ondansetron ODT 4 MG disintegrating tablet  Commonly known as: ZOFRAN-ODT  Place 1 tablet on the tongue 4 (Four) Times a Day As Needed for Nausea or Vomiting.     promethazine 25 MG tablet  Commonly known as: PHENERGAN  Take 1 tablet by mouth Every 6 (Six) Hours As Needed for Nausea or Vomiting.               Where to Get Your Medications        These medications were sent to [facility]/pharmacy #[id number] - [place], [place] - [address] [place] [address] - [phone]  - [phone] [address] [address]      Phone: [phone]   ondansetron ODT 4 MG disintegrating tablet  promethazine 25 MG tablet               Bahman Villa MD  09/15/23 0116

## 2024-01-22 ENCOUNTER — TELEPHONE (OUTPATIENT)
Dept: PAIN MEDICINE | Facility: CLINIC | Age: 74
End: 2024-01-22

## 2024-01-22 NOTE — TELEPHONE ENCOUNTER
Caller: Ariana Doshi    Relationship to patient: Self    Best call back number: [phone] (home)     Patient is needing: PATIENT IS CALLING TO SEE IF DR MARTINEZ NEEDS TO COMMUNICATE WITH OR HAVE HER OTHER DR COMMUNICATE WITH DR MARTINEZ IN REGARDS TO HER STOPPING HER KLONOPIN. PLEASE ADVISE PATIENT

## 2024-03-13 ENCOUNTER — APPOINTMENT (OUTPATIENT)
Dept: CT IMAGING | Facility: HOSPITAL | Age: 74
End: 2024-03-13
Payer: MEDICARE

## 2024-03-13 ENCOUNTER — HOSPITAL ENCOUNTER (EMERGENCY)
Facility: HOSPITAL | Age: 74
Discharge: HOME | End: 2024-03-13
Attending: EMERGENCY MEDICINE
Payer: MEDICARE

## 2024-03-13 VITALS
HEIGHT: 61 IN | TEMPERATURE: 98.4 F | WEIGHT: 137 LBS | RESPIRATION RATE: 18 BRPM | BODY MASS INDEX: 25.86 KG/M2 | OXYGEN SATURATION: 94 % | SYSTOLIC BLOOD PRESSURE: 138 MMHG | DIASTOLIC BLOOD PRESSURE: 77 MMHG | HEART RATE: 79 BPM

## 2024-03-13 DIAGNOSIS — K59.00 CONSTIPATION, UNSPECIFIED CONSTIPATION TYPE: Primary | ICD-10-CM

## 2024-03-13 LAB
ALBUMIN SERPL-MCNC: 3.7 G/DL (ref 3.5–5.2)
ALBUMIN/GLOB SERPL: 1.1 G/DL
ALP SERPL-CCNC: 107 U/L (ref 39–117)
ALT SERPL W P-5'-P-CCNC: 17 U/L (ref 1–33)
ANION GAP SERPL CALCULATED.3IONS-SCNC: 8.3 MMOL/L (ref 5–15)
AST SERPL-CCNC: 16 U/L (ref 1–32)
BACTERIA UR QL AUTO: ABNORMAL /HPF
BASOPHILS # BLD AUTO: 0.06 10*3/MM3 (ref 0–0.2)
BASOPHILS NFR BLD AUTO: 1 % (ref 0–1.5)
BILIRUB SERPL-MCNC: 0.4 MG/DL (ref 0–1.2)
BILIRUB UR QL STRIP: NEGATIVE
BUN SERPL-MCNC: 23 MG/DL (ref 8–23)
BUN/CREAT SERPL: 23.5 (ref 7–25)
CALCIUM SPEC-SCNC: 8.5 MG/DL (ref 8.6–10.5)
CHLORIDE SERPL-SCNC: 103 MMOL/L (ref 98–107)
CLARITY UR: CLEAR
CO2 SERPL-SCNC: 24.7 MMOL/L (ref 22–29)
COLOR UR: YELLOW
CREAT SERPL-MCNC: 0.98 MG/DL (ref 0.57–1)
DEPRECATED RDW RBC AUTO: 45.7 FL (ref 37–54)
EGFRCR SERPLBLD CKD-EPI 2021: 61.1 ML/MIN/1.73
EOSINOPHIL # BLD AUTO: 0.29 10*3/MM3 (ref 0–0.4)
EOSINOPHIL NFR BLD AUTO: 4.9 % (ref 0.3–6.2)
ERYTHROCYTE [DISTWIDTH] IN BLOOD BY AUTOMATED COUNT: 14.1 % (ref 12.3–15.4)
GLOBULIN UR ELPH-MCNC: 3.3 GM/DL
GLUCOSE SERPL-MCNC: 106 MG/DL (ref 65–99)
GLUCOSE UR STRIP-MCNC: NEGATIVE MG/DL
HCT VFR BLD AUTO: 36.5 % (ref 34–46.6)
HGB BLD-MCNC: 11.1 G/DL (ref 12–15.9)
HGB UR QL STRIP.AUTO: NEGATIVE
HYALINE CASTS UR QL AUTO: ABNORMAL /LPF
IMM GRANULOCYTES # BLD AUTO: 0.04 10*3/MM3 (ref 0–0.05)
IMM GRANULOCYTES NFR BLD AUTO: 0.7 % (ref 0–0.5)
KETONES UR QL STRIP: NEGATIVE
LEUKOCYTE ESTERASE UR QL STRIP.AUTO: ABNORMAL
LIPASE SERPL-CCNC: 12 U/L (ref 13–60)
LYMPHOCYTES # BLD AUTO: 2.23 10*3/MM3 (ref 0.7–3.1)
LYMPHOCYTES NFR BLD AUTO: 37.9 % (ref 19.6–45.3)
MCH RBC QN AUTO: 26.9 PG (ref 26.6–33)
MCHC RBC AUTO-ENTMCNC: 30.4 G/DL (ref 31.5–35.7)
MCV RBC AUTO: 88.4 FL (ref 79–97)
MONOCYTES # BLD AUTO: 0.49 10*3/MM3 (ref 0.1–0.9)
MONOCYTES NFR BLD AUTO: 8.3 % (ref 5–12)
NEUTROPHILS NFR BLD AUTO: 2.77 10*3/MM3 (ref 1.7–7)
NEUTROPHILS NFR BLD AUTO: 47.2 % (ref 42.7–76)
NITRITE UR QL STRIP: NEGATIVE
PH UR STRIP.AUTO: 5.5 [PH] (ref 5–8)
PLATELET # BLD AUTO: 286 10*3/MM3 (ref 140–450)
PMV BLD AUTO: 9.1 FL (ref 6–12)
POTASSIUM SERPL-SCNC: 4.7 MMOL/L (ref 3.5–5.2)
PROT SERPL-MCNC: 7 G/DL (ref 6–8.5)
PROT UR QL STRIP: NEGATIVE
RBC # BLD AUTO: 4.13 10*6/MM3 (ref 3.77–5.28)
RBC # UR STRIP: ABNORMAL /HPF
REF LAB TEST METHOD: ABNORMAL
SODIUM SERPL-SCNC: 136 MMOL/L (ref 136–145)
SP GR UR STRIP: 1.02 (ref 1–1.03)
SQUAMOUS #/AREA URNS HPF: ABNORMAL /HPF
UROBILINOGEN UR QL STRIP: ABNORMAL
WBC # UR STRIP: ABNORMAL /HPF
WBC NRBC COR # BLD AUTO: 5.88 10*3/MM3 (ref 3.4–10.8)

## 2024-03-13 PROCEDURE — 99284 EMERGENCY DEPT VISIT MOD MDM: CPT | Performed by: PHYSICIAN ASSISTANT

## 2024-03-13 PROCEDURE — 81001 URINALYSIS AUTO W/SCOPE: CPT | Performed by: EMERGENCY MEDICINE

## 2024-03-13 PROCEDURE — 36415 COLL VENOUS BLD VENIPUNCTURE: CPT

## 2024-03-13 PROCEDURE — 96361 HYDRATE IV INFUSION ADD-ON: CPT

## 2024-03-13 PROCEDURE — 80053 COMPREHEN METABOLIC PANEL: CPT | Performed by: PHYSICIAN ASSISTANT

## 2024-03-13 PROCEDURE — 99285 EMERGENCY DEPT VISIT HI MDM: CPT

## 2024-03-13 PROCEDURE — 25510000001 IOPAMIDOL PER 1 ML: Performed by: EMERGENCY MEDICINE

## 2024-03-13 PROCEDURE — 74177 CT ABD & PELVIS W/CONTRAST: CPT

## 2024-03-13 PROCEDURE — 96360 HYDRATION IV INFUSION INIT: CPT

## 2024-03-13 PROCEDURE — 85025 COMPLETE CBC W/AUTO DIFF WBC: CPT | Performed by: PHYSICIAN ASSISTANT

## 2024-03-13 PROCEDURE — 25810000003 SODIUM CHLORIDE 0.9 % SOLUTION: Performed by: PHYSICIAN ASSISTANT

## 2024-03-13 PROCEDURE — 83690 ASSAY OF LIPASE: CPT | Performed by: PHYSICIAN ASSISTANT

## 2024-03-13 RX ORDER — DOCUSATE SODIUM 100 MG/1
100 CAPSULE, LIQUID FILLED ORAL 2 TIMES DAILY PRN
Qty: 30 CAPSULE | Refills: 0 | Status: SHIPPED | OUTPATIENT
Start: 2024-03-13

## 2024-03-13 RX ORDER — SODIUM CHLORIDE 9 MG/ML
125 INJECTION, SOLUTION INTRAVENOUS CONTINUOUS
Status: DISCONTINUED | OUTPATIENT
Start: 2024-03-13 | End: 2024-03-13 | Stop reason: HOSPADM

## 2024-03-13 RX ORDER — POLYETHYLENE GLYCOL 3350 17 G/17G
17 POWDER, FOR SOLUTION ORAL DAILY
Qty: 510 G | Refills: 0 | Status: SHIPPED | OUTPATIENT
Start: 2024-03-13

## 2024-03-13 RX ADMIN — SODIUM CHLORIDE 125 ML/HR: 9 INJECTION, SOLUTION INTRAVENOUS at 18:50

## 2024-03-13 RX ADMIN — IOPAMIDOL 100 ML: 755 INJECTION, SOLUTION INTRAVENOUS at 19:30

## 2024-03-13 NOTE — [facility] PROVIDER NOTE
EMERGENCY DEPARTMENT ENCOUNTER    Room Number:  01/01  Date seen:  3/13/2024  Time seen: 17:07 EDT  PCP: Yun Newman MD  Historian: Patient    Discussed/obtained information from independent historians: N/A    HPI:  Chief complaint: Left flank pain  A complete HPI/ROS/PMH/PSH/SH/FH are unobtainable due to: Patient is somewhat of a poor historian  Context:Ariana Doshi is a 73 y.o. female with significant past medical history of time diabetes, MI, URIEL, anemia, ureteral stone who presents to the ED with c/o left flank pain has been ongoing for 3 days.  Patient states there is nausea associated with the flank pain but there has been no vomiting.  There is no abnormal bowel movements.  Her pain is said to be moderate at present.  The pain seems to wax and wane in severity as well as come and go.  She states the pain is not positional.  She states it feels somewhat similar to past kidney stones.  She also is complaining of dysuria and frequency.    External (non-ED) record review: Patient was seen by Dr. Newman 2/23/2024 [facility].  Apparently the patient's medications are being refilled.  It was also noted in the patient's chart that she is on a morphine pain pump which had recently caused constipation.  She was also noted to be on Klonopin.    Chronic or social conditions impacting care:    ALLERGIES  Lansoprazole, Propoxyphene, Tape, and Valium [diazepam]    PAST MEDICAL HISTORY  Active Ambulatory Problems     Diagnosis Date Noted    Diarrhea 07/19/2022    Nausea 07/19/2022    Other constipation 07/19/2022    Hemorrhage of anus and rectum 07/19/2022    Pain of upper abdomen 08/04/2022     Resolved Ambulatory Problems     Diagnosis Date Noted    No Resolved Ambulatory Problems     Past Medical History:   Diagnosis Date    Breast cancer     Diabetes mellitus     Disease of thyroid gland     GERD (gastroesophageal reflux disease)     History of transfusion     MI (myocardial  infarction)     Sleep apnea        PAST SURGICAL HISTORY  Past Surgical History:   Procedure Laterality Date    BACK SURGERY      multiple    CHOLECYSTECTOMY      COLONOSCOPY N/A 07/22/2022    Procedure: COLONOSCOPY;  Surgeon: Chacha Clark MD;  Location: [facility] ENDOSCOPY;  Service: Gastroenterology;  Laterality: N/A;    DILATATION AND CURETTAGE      multiple    ENDOSCOPY N/A 07/22/2022    Procedure: ESOPHAGOGASTRODUODENOSCOPY;  Surgeon: Chacha Clark MD;  Location: [facility] ENDOSCOPY;  Service: Gastroenterology;  Laterality: N/A;    ERCP N/A 8/16/2022    Procedure: ENDOSCOPIC RETROGRADE CHOLANGIOPANCREATOGRAPHY;  Surgeon: Chacha Clark MD;  Location: [facility] ENDOSCOPY;  Service: Gastroenterology;  Laterality: N/A;    GASTRIC BYPASS      HYSTERECTOMY      KNEE SURGERY Bilateral     multiple    MASTECTOMY Bilateral     NECK SURGERY      multiple    RECTOCELE REPAIR      multiple       FAMILY HISTORY  Family History   Problem Relation Age of Onset    Thyroid disease Other     Diabetes Other     Cancer Other     Heart disease Other        SOCIAL HISTORY  Social History     Socioeconomic History    Marital status:    Tobacco Use    Smoking status: Never    Smokeless tobacco: Never   Vaping Use    Vaping status: Never Used   Substance and Sexual Activity    Alcohol use: Never    Drug use: Never    Sexual activity: Defer       REVIEW OF SYSTEMS  Review of Systems    All systems reviewed and negative except for those discussed in HPI.     PHYSICAL EXAM    I have reviewed the triage vital signs and nursing notes.  Vitals:    03/13/24 2058   BP: 149/83   Pulse: 79   Resp:    Temp:    SpO2: 94%     Physical Exam    GENERAL: Chronically ill in appearance, nontoxic, not distressed  HENT: nares patent  EYES: no scleral icterus  NECK: no ROM limitations  CV: regular rhythm, regular rate  RESPIRATORY: normal effort, lungs CTAB  ABDOMEN: There is some mild tenderness in the left lower quadrant without guarding or  rebound.  : No obvious CVA tenderness bilaterally.  MUSCULOSKELETAL: no deformity  NEURO: alert, moves all extremities, follows commands  SKIN: warm, dry    LAB RESULTS  Recent Results (from the past 24 hour(s))   Urinalysis With Culture If Indicated - Urine, Clean Catch    Collection Time: 03/13/24  5:05 PM    Specimen: Urine, Clean Catch   Result Value Ref Range    Color, UA Yellow Yellow, Straw    Appearance, UA Clear Clear    pH, UA 5.5 5.0 - 8.0    Specific Gravity, UA 1.025 1.005 - 1.030    Glucose, UA Negative Negative    Ketones, UA Negative Negative    Bilirubin, UA Negative Negative    Blood, UA Negative Negative    Protein, UA Negative Negative    Leuk Esterase, UA Trace (A) Negative    Nitrite, UA Negative Negative    Urobilinogen, UA 0.2 E.U./dL 0.2 - 1.0 E.U./dL   Lipase    Collection Time: 03/13/24  6:08 PM    Specimen: Blood   Result Value Ref Range    Lipase 12 (L) 13 - 60 U/L   CBC Auto Differential    Collection Time: 03/13/24  6:08 PM    Specimen: Blood   Result Value Ref Range    WBC 5.88 3.40 - 10.80 10*3/mm3    RBC 4.13 3.77 - 5.28 10*6/mm3    Hemoglobin 11.1 (L) 12.0 - 15.9 g/dL    Hematocrit 36.5 34.0 - 46.6 %    MCV 88.4 79.0 - 97.0 fL    MCH 26.9 26.6 - 33.0 pg    MCHC 30.4 (L) 31.5 - 35.7 g/dL    RDW 14.1 12.3 - 15.4 %    RDW-SD 45.7 37.0 - 54.0 fl    MPV 9.1 6.0 - 12.0 fL    Platelets 286 140 - 450 10*3/mm3    Neutrophil % 47.2 42.7 - 76.0 %    Lymphocyte % 37.9 19.6 - 45.3 %    Monocyte % 8.3 5.0 - 12.0 %    Eosinophil % 4.9 0.3 - 6.2 %    Basophil % 1.0 0.0 - 1.5 %    Immature Grans % 0.7 (H) 0.0 - 0.5 %    Neutrophils, Absolute 2.77 1.70 - 7.00 10*3/mm3    Lymphocytes, Absolute 2.23 0.70 - 3.10 10*3/mm3    Monocytes, Absolute 0.49 0.10 - 0.90 10*3/mm3    Eosinophils, Absolute 0.29 0.00 - 0.40 10*3/mm3    Basophils, Absolute 0.06 0.00 - 0.20 10*3/mm3    Immature Grans, Absolute 0.04 0.00 - 0.05 10*3/mm3   Comprehensive Metabolic Panel    Collection Time: 03/13/24  6:48 PM     Specimen: Blood   Result Value Ref Range    Glucose 106 (H) 65 - 99 mg/dL    BUN 23 8 - 23 mg/dL    Creatinine 0.98 0.57 - 1.00 mg/dL    Sodium 136 136 - 145 mmol/L    Potassium 4.7 3.5 - 5.2 mmol/L    Chloride 103 98 - 107 mmol/L    CO2 24.7 22.0 - 29.0 mmol/L    Calcium 8.5 (L) 8.6 - 10.5 mg/dL    Total Protein 7.0 6.0 - 8.5 g/dL    Albumin 3.7 3.5 - 5.2 g/dL    ALT (SGPT) 17 1 - 33 U/L    AST (SGOT) 16 1 - 32 U/L    Alkaline Phosphatase 107 39 - 117 U/L    Total Bilirubin 0.4 0.0 - 1.2 mg/dL    Globulin 3.3 gm/dL    A/G Ratio 1.1 g/dL    BUN/Creatinine Ratio 23.5 7.0 - 25.0    Anion Gap 8.3 5.0 - 15.0 mmol/L    eGFR 61.1 >60.0 mL/min/1.73       Ordered the above labs and independently interpreted results.  My findings will be discussed in the ED course or medical decision making section below    RADIOLOGY RESULTS  CT Abdomen Pelvis With Contrast    Result Date: 3/13/2024  CT ABDOMEN PELVIS W CONTRAST Date of Exam: 3/13/2024 7:18 PM EDT Indication: Left lower quadrant and left flank pain. Comparison: CT abdomen pelvis 12/28/2018. Technique: Axial CT images were obtained of the abdomen and pelvis following the uneventful intravenous administration of iodinated contrast. Sagittal and coronal reconstructions were performed.  Automated exposure control and iterative reconstruction methods were used. Findings: Lower thorax: Bibasilar scarring/subsegmental atelectasis. Benign calcified granuloma in the left lower lobe. No focal consolidation. Partially visualized bilateral breast implants. Left hemidiaphragm elevation. Liver: No focal hepatic lesion. Calcified granulomata. Gallbladder and bile ducts: Cholecystectomy. No abnormal biliary ductal dilatation. Pancreas: Mild pancreatic ductal dilatation, which per chart review has been evaluated endoscopically. No surrounding inflammation. Spleen: Normal in size. Adrenal glands: No adrenal nodule. Kidneys: Symmetric in size and enhancement. No hydronephrosis. Left-sided  nonobstructing nephrolithiasis. Urinary bladder: Unremarkable. Reproductive organs: Hysterectomy. Stomach and bowel: Postsurgical changes of gastric bypass. 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 Formed stool throughout the colon with fecalization of distal small bowel contents, suggestive of constipation. No evidence of bowel obstruction. Lymph nodes: No pathologically enlarged lymph nodes. Vessels: No abdominal aortic aneurysm. Major vasculature is patent. Atherosclerosis. Peritoneum and retroperitoneum: No free air or free fluid. Soft tissues: Stimulator device in the subcutaneous tissues of the left intra-abdominal wall.. Osseous structures: No acute or suspicious osseous lesions. L3-L5 posterior fusion with interbody spacers     Impression: Findings suggestive of constipation. No evidence of bowel obstruction. Postsurgical changes of gastric bypass with double pigtail stent across a gastroenteric fistula. Left-sided nonobstructing nephrolithiasis. Additional ancillary/nonemergent findings, as above. Electronically Signed: Durga Hood MD  3/13/2024 7:57 PM EDT  Workstation ID: [id number]      Ordered the above noted radiological studies.  Independently interpreted by me.  My findings will be discussed in the medical decision section below.     PROGRESS, DATA ANALYSIS, CONSULTS AND MEDICAL DECISION MAKING    Please note that this section constitutes my independent interpretation of clinical data including lab results, radiology, EKG's.  This constitutes my independent professional opinion regarding differential diagnosis and management of this patient.  It may include any factors such as history from outside sources, review of external records, social determinants of health, management of medications, response to those treatments, and discussions with other providers.    ED Course as of 03/13/24 2120   Wed Mar 13, 2024   1729 Nitrite, UA: Negative [RC]   1729 Blood, UA: Negative [RC]   1729  Bilirubin, UA: Negative [RC]   1843 Lipase(!): 12 [RC]   2037   IMPRESSION:  Impression:     Findings suggestive of constipation. No evidence of bowel obstruction.     Postsurgical changes of gastric bypass with double pigtail stent across a gastroenteric fistula.     Left-sided nonobstructing nephrolithiasis.     Additional ancillary/nonemergent findings, as above.        Electronically Signed: Durga Hood MD    3/13/2024 7:57 PM EDT    Workstation ID: [id number]   [RC]   2037 Viewed the patient's CT abdomen pelvis my individual interpretation is that of no obstructive process, ureteral stone, hydronephrosis, or other inflammatory process. [RC]   2110 WBC: 5.88 [RC]   2110 RBC: 4.13 [RC]   2110 Hemoglobin(!): 11.1 [RC]   2110 Hematocrit: 36.5 [RC]   2110 Platelets: 286 [RC]   2111 Lipase(!): 12 [RC]   2111 Glucose(!): 106 [RC]   2111 BUN: 23 [RC]   2111 Creatinine: 0.98 [RC]   2111 Sodium: 136 [RC]   2111 Potassium: 4.7 [RC]   2111 CO2: 24.7 [RC]   2111 Anion Gap: 8.3 [RC]   2111 ALT (SGPT): 17 [RC]   2111 AST (SGOT): 16 [RC]   2111 Alkaline Phosphatase: 107 [RC]      ED Course User Index  [RC] Avtar Vanegas III, PA     Orders placed during this visit:  Orders Placed This Encounter   Procedures    CT Abdomen Pelvis With Contrast    Urinalysis With Culture If Indicated - Urine, Clean Catch    Comprehensive Metabolic Panel    Lipase    CBC Auto Differential    Urinalysis, Microscopic Only - Urine, Clean Catch    [place] Urine Culture Tube -    CBC & Differential    ED Acknowledgement Form Needed;            Medical Decision Making  Problems Addressed:  Constipation, unspecified constipation type: complicated acute illness or injury    Amount and/or Complexity of Data Reviewed  Labs: ordered. Decision-making details documented in ED Course.  Radiology: ordered.    Risk  OTC drugs.  Prescription drug management.      Ureteral stone, pyelonephritis, diverticulitis, SBO, acute pancreatitis, AAA, other  intra-abdominal process.  Will obtain CBC, CMP, lipase, UA.  Given that pyelonephritis is in the differential and the patient does have some left lower quadrant pain will obtain CT abdomen pelvis with IV contrast.      DIAGNOSIS  Final diagnoses:   Constipation, unspecified constipation type          Medication List        New Prescriptions      docusate sodium 100 MG capsule  Commonly known as: COLACE  Take 1 capsule by mouth 2 (Two) Times a Day As Needed for Constipation.     polyethylene glycol 17 GM/SCOOP powder  Commonly known as: MIRALAX  Take 17 g by mouth Daily.               Where to Get Your Medications        These medications were sent to [facility] - [place], [place] - [address] - [phone]  - [phone]  [address] [facility] [address] [place] [address]      Phone: [phone]   docusate sodium 100 MG capsule  polyethylene glycol 17 GM/SCOOP powder         FOLLOW-UP  Yun Newman MD  [address] [facility] [address]  [place] [address] [phone]    Schedule an appointment as soon as possible for a visit   For further evaluation and treatment        Latest Documented Vital Signs:  As of 21:20 EDT  BP- 149/83 HR- 79 Temp- 98.4 °F (36.9 °C) O2 sat- 94%    Appropriate PPE utilized throughout this patient encounter to include mask, if indicated, per current protocol. Hand hygiene was performed before donning PPE and after removal when leaving the room.    Please note that portions of this were completed with a voice recognition program.     Note Disclaimer: At [facility], we believe that sharing information builds trust and better relationships. You are receiving this note because you are receiving care at [facility] or recently visited. It is possible you will see health information before a provider has talked with you about it. This kind of information can be easy to misunderstand. To help you fully understand what it means for your health, we urge you to discuss this note with  your provider.

## 2024-03-14 NOTE — DISCHARGE INSTRUCTIONS
Take 1 scoop of MiraLAX twice a day.  If no desired results increase to 2 scoops twice a day.  If still no results increase to 3 scoops twice a day.  Take the prescribed Colace twice daily as well keep stool soft as needed.  Ensure you are [place] and taking plenty of fiber and drinking plenty of fluids.  Follow-up with your family physician if no significant improvement in early next week.  Return to the ER with fever, worsening symptoms, or should you have any further concerns

## 2024-03-28 ENCOUNTER — OFFICE VISIT (OUTPATIENT)
Dept: CARDIOLOGY | Facility: CLINIC | Age: 74
End: 2024-03-28
Payer: MEDICARE

## 2024-03-28 VITALS
BODY MASS INDEX: 25.91 KG/M2 | WEIGHT: 132 LBS | HEART RATE: 67 BPM | SYSTOLIC BLOOD PRESSURE: 109 MMHG | RESPIRATION RATE: 18 BRPM | DIASTOLIC BLOOD PRESSURE: 73 MMHG | HEIGHT: 60 IN

## 2024-03-28 DIAGNOSIS — R94.31 ABNORMAL EKG: ICD-10-CM

## 2024-03-28 DIAGNOSIS — Z13.220 SCREENING FOR CHOLESTEROL LEVEL: ICD-10-CM

## 2024-03-28 DIAGNOSIS — R60.9 SWELLING: ICD-10-CM

## 2024-03-28 DIAGNOSIS — R06.09 DOE (DYSPNEA ON EXERTION): Primary | ICD-10-CM

## 2024-03-28 DIAGNOSIS — Z00.00 PREVENTATIVE HEALTH CARE: ICD-10-CM

## 2024-03-28 DIAGNOSIS — I10 ESSENTIAL (PRIMARY) HYPERTENSION: ICD-10-CM

## 2024-03-28 RX ORDER — LOSARTAN POTASSIUM AND HYDROCHLOROTHIAZIDE 12.5; 5 MG/1; MG/1
TABLET ORAL DAILY
COMMUNITY
Start: 2024-03-21

## 2024-03-28 RX ORDER — HYDROXYZINE HYDROCHLORIDE 10 MG/1
TABLET, FILM COATED ORAL NIGHTLY
COMMUNITY
Start: 2024-03-16

## 2024-03-28 RX ORDER — PRAMIPEXOLE DIHYDROCHLORIDE 0.5 MG/1
TABLET ORAL AS NEEDED
COMMUNITY
Start: 2024-03-06

## 2024-03-28 RX ORDER — HYDRALAZINE HYDROCHLORIDE 10 MG/1
TABLET, FILM COATED ORAL
COMMUNITY
Start: 2024-03-19

## 2024-03-28 RX ORDER — CLONIDINE HYDROCHLORIDE 0.1 MG/1
1 TABLET ORAL 3 TIMES DAILY
COMMUNITY
Start: 2024-03-22

## 2024-04-15 NOTE — PROGRESS NOTES
"Cardiology Clinic Note  Jay Shrestha MD, PhD    Subjective:     Encounter Date:03/28/2024      Patient ID: Ariana Doshi is a 73 y.o. female.    Chief Complaint:  Chief Complaint   Patient presents with    Cardiomyopathy       HPI:      I had the pleasure disease this 73-year-old as a new patient for history of cardiomyopathy, history of remote MI, essential hypertension, last echo 2018 EF 55-60%, normal RV size and function, mild left atrial enlargement, trace to mild MR and TR.  Complains of dyspnea on exertion as well as lower extremity edema as well as some degree of chest heaviness in addition to her dyspnea exertion.  EKG is abnormal sinus rhythm with very poor R-wave progression across the precordial leads cannot rule out possible anterior myocardial infarction.  With her new symptoms abnormal EKG we discussed workup with repeat 2D echo as well as stress evaluation and correlation with newly updated labs       Review of systems otherwise negative x14 point review of systems except as mentioned above    Historical data copied forward from previous encounters in EMR including the history, exam, and assessment/plan has been reviewed and is unchanged unless noted otherwise.    Cardiac medicines reviewed with risk, benefits, and necessity of each discussed.    Risk and benefit of cardiac testing reviewed including death heart attack stroke pain bleeding infection need for vascular /cardiovascular surgery were discussed and the patient     Objective:         /73 (BP Location: Right arm, Patient Position: Sitting)   Pulse 67   Resp 18   Ht 152.4 cm (60\")   Wt 59.9 kg (132 lb)   BMI 25.78 kg/m²     Physical Exam    Assessment:       Independently manage medical conditions  Dyspnea on exertion   Coronary artery disease history of MI   Cardiomyopathy  Chest pain  Essential hypertension  Abnormal EKG   Preventative healthcare      Diagnoses and all orders for this visit:    1. CAMPBELL (dyspnea on exertion) " (Primary)  -     Adult Transthoracic Echo Complete W/ Color, Spectral and Contrast if Necessary Per Protocol; Future  -     Stress Test With Myocardial Perfusion One Day; Future  -     Lipid Panel; Future  -     Comprehensive Metabolic Panel; Future  -     CBC (No Diff); Future  -     TSH; Future  -     Urinalysis without microscopic (no culture) - Urine, Clean Catch; Future  -     Magnesium; Future  -     CT Cardiac Calcium Score Without Dye; Future    2. Swelling  -     Adult Transthoracic Echo Complete W/ Color, Spectral and Contrast if Necessary Per Protocol; Future  -     Stress Test With Myocardial Perfusion One Day; Future  -     Lipid Panel; Future  -     Comprehensive Metabolic Panel; Future  -     CBC (No Diff); Future  -     TSH; Future  -     Urinalysis without microscopic (no culture) - Urine, Clean Catch; Future  -     Magnesium; Future  -     CT Cardiac Calcium Score Without Dye; Future    3. Abnormal EKG  -     Adult Transthoracic Echo Complete W/ Color, Spectral and Contrast if Necessary Per Protocol; Future  -     Stress Test With Myocardial Perfusion One Day; Future  -     Lipid Panel; Future  -     Comprehensive Metabolic Panel; Future  -     CBC (No Diff); Future  -     TSH; Future  -     Urinalysis without microscopic (no culture) - Urine, Clean Catch; Future  -     Magnesium; Future  -     CT Cardiac Calcium Score Without Dye; Future    4. Screening for cholesterol level  -     Lipid Panel; Future    5. Preventative health care  -     Lipid Panel; Future    6. Essential (primary) hypertension   Continue present medications for now       Further recommendations based on clinical findings and workup        The pleasure to be involved in this patient's cardiovascular care.  Please call with any questions or concerns  Jay Shrestha MD, PhD    Most recent EKG as reviewed and interpreted by me:    ECG 12 Lead    Date/Time: 3/28/2024 10:23 PM  Performed by: Jay Shrestha MD    Authorized  by: Jay Shrestha MD  Comparison: not compared with previous ECG   Previous ECG: no previous ECG available  Rhythm: sinus rhythm  Rate: normal  QRS axis: normal  Other findings: non-specific ST-T wave changes    Clinical impression: abnormal EKG  Comments:  Possible anterior MI , indeterminate age           Most recent echo as reviewed and interpreted by me:      Most recent stress test as reviewed and interpreted by me:      Most recent cardiac catheterization as reviewed interpreted by me:  No results found for this or any previous visit.    The following portions of the patient's history were reviewed and updated as appropriate: allergies, current medications, past family history, past medical history, past social history, past surgical history, and problem list.      ROS:  14 point review of systems negative except as mentioned above    Current Outpatient Medications:     Betimol 0.25 % ophthalmic solution, Administer 1 drop to both eyes Daily., Disp: , Rfl:     busPIRone (BUSPAR) 7.5 MG tablet, Take 2 tablets by mouth 2 (Two) Times a Day., Disp: , Rfl:     celecoxib (CeleBREX) 200 MG capsule, Take 1 capsule by mouth Daily., Disp: , Rfl:     cetirizine (zyrTEC) 10 MG tablet, Take 1 tablet by mouth Daily., Disp: , Rfl:     cloNIDine (CATAPRES) 0.1 MG tablet, Take 1 tablet by mouth 3 times a day., Disp: , Rfl:     Cyanocobalamin (B-12 IJ), Inject  as directed., Disp: , Rfl:     diphenhydrAMINE (BENADRYL) 25 mg capsule, Take 1 capsule by mouth Every Night., Disp: , Rfl:     DULoxetine (CYMBALTA) 60 MG capsule, Take 1 capsule by mouth 2 (Two) Times a Day., Disp: , Rfl:     hydrALAZINE (APRESOLINE) 10 MG tablet, TAKE ONE TABLET BY MOUTH EVERY 8 HOURS AS NEEDED IF BLOOD PRESSURE HIGHER THAN 150/95, Disp: , Rfl:     hydrOXYzine (ATARAX) 10 MG tablet, Every Night., Disp: , Rfl:     levothyroxine (SYNTHROID, LEVOTHROID) 50 MCG tablet, Take 1 tablet by mouth Daily., Disp: , Rfl:     losartan-hydrochlorothiazide  (HYZAAR) 50-12.5 MG per tablet, Daily., Disp: , Rfl:     MORPHINE SULFATE IJ, Inject  as directed. Pain Pump contains morphine intrathecal, Disp: , Rfl:     Omeprazole 20 MG tablet delayed-release, Take 40 mg by mouth 2 (Two) Times a Day., Disp: , Rfl:     polyethylene glycol (MIRALAX) 17 GM/SCOOP powder, Take 17 g by mouth Daily., Disp: 510 g, Rfl: 0    pramipexole (MIRAPEX) 0.5 MG tablet, As Needed., Disp: , Rfl:     Prenatal Multivit-Min-Fe-FA (Prenatal/Iron) tablet, Take 1 tablet by mouth Daily., Disp: , Rfl:     prochlorperazine (COMPAZINE) 5 MG tablet, Take 1 tablet by mouth Every 6 (Six) Hours As Needed for Nausea or Vomiting., Disp: 20 tablet, Rfl: 0    promethazine (PHENERGAN) 25 MG tablet, Take 1 tablet by mouth Every 6 (Six) Hours As Needed for Nausea or Vomiting., Disp: 12 tablet, Rfl: 0    rizatriptan MLT (MAXALT-MLT) 10 MG disintegrating tablet, Place 1 tablet on the tongue As Needed., Disp: , Rfl:     traZODone (DESYREL) 150 MG tablet, Take 1 tablet by mouth every night at bedtime., Disp: , Rfl:     naloxone (NARCAN) 4 MG/0.1ML nasal spray, 1 spray into the nostril(s) as directed by provider As Needed., Disp: , Rfl:     Problem List:  Patient Active Problem List   Diagnosis    Diarrhea    Nausea    Other constipation    Hemorrhage of anus and rectum    Pain of upper abdomen     Past Medical History:  Past Medical History:   Diagnosis Date    Breast cancer     Diabetes mellitus     diet controlled    Disease of thyroid gland     GERD (gastroesophageal reflux disease)     History of transfusion     MI (myocardial infarction)     Sleep apnea      Past Surgical History:  Past Surgical History:   Procedure Laterality Date    BACK SURGERY      multiple    CHOLECYSTECTOMY      COLONOSCOPY N/A 07/22/2022    Procedure: COLONOSCOPY;  Surgeon: Chacha Clark MD;  Location: [facility] ENDOSCOPY;  Service: Gastroenterology;  Laterality: N/A;    DILATATION AND CURETTAGE      multiple    ENDOSCOPY N/A 07/22/2022     Procedure: ESOPHAGOGASTRODUODENOSCOPY;  Surgeon: Chacha Clark MD;  Location: [facility] ENDOSCOPY;  Service: Gastroenterology;  Laterality: N/A;    ERCP N/A 8/16/2022    Procedure: ENDOSCOPIC RETROGRADE CHOLANGIOPANCREATOGRAPHY;  Surgeon: Chacha Clark MD;  Location: [facility] ENDOSCOPY;  Service: Gastroenterology;  Laterality: N/A;    GASTRIC BYPASS      HYSTERECTOMY      KNEE SURGERY Bilateral     multiple    MASTECTOMY Bilateral     NECK SURGERY      multiple    RECTOCELE REPAIR      multiple     Social History:  Social History     Socioeconomic History    Marital status:    Tobacco Use    Smoking status: Never    Smokeless tobacco: Never   Vaping Use    Vaping status: Never Used   Substance and Sexual Activity    Alcohol use: Never    Drug use: Never    Sexual activity: Defer     Allergies:  Allergies   Allergen Reactions    Lansoprazole Hives    Propoxyphene GI Intolerance    Tape Rash    Valium [Diazepam] Anxiety     Immunizations:  Immunization History   Administered Date(s) Administered    COVID-19 (PFIZER) BIVALENT 12+YRS 11/24/2022    COVID-19 (PFIZER) Purple Cap Monovalent 05/21/2021, 06/11/2021            In-Office Procedure(s):  No orders to display        ASCVD RIsk Score::  The ASCVD Risk score (Sapna DK, et al., 2019) failed to calculate for the following reasons:    Cannot find a previous HDL lab    Cannot find a previous total cholesterol lab    Imaging:    Results for orders placed during the hospital encounter of 07/12/22    XR Chest 1 View    Narrative  PROCEDURE: XR CHEST 1 VIEW, 7/12/2022 9:28 PM CDT    CLINICAL INDICATION:    weakness.    COMPARISON: None    TECHNIQUE:  AP portable radiograph of chest    FINDINGS:    Tortuous aorta. Cardiac silhouette size within normal limits.  Hazy opacification projecting over the mid and lower chest  bilaterally is favored to reflect summation artifact from  overlying soft tissues. Calcified granuloma projecting over the  peripheral left lower  lung.    Distention or dilatation of loops of bowel underneath the left  hemidiaphragm.    Impression  Distention or dilatation of loops of bowel underneath left  hemidiaphragm.    Hazy opacification projecting over the mid and lower chest  bilaterally is favored reflect summation artifact from overlying  soft tissues. Alternatively, could reflect edema or other process  if in an appropriate scenario.              Electronically signed by:  Manuel Crawford MD  7/12/2022 9:52 PM  CDT Workstation: [id number]       Results for orders placed during the hospital encounter of 03/13/24    CT Abdomen Pelvis With Contrast    Narrative  CT ABDOMEN PELVIS W CONTRAST    Date of Exam: 3/13/2024 7:18 PM EDT    Indication: Left lower quadrant and left flank pain.    Comparison: CT abdomen pelvis 12/28/2018.    Technique: Axial CT images were obtained of the abdomen and pelvis following the uneventful intravenous administration of iodinated contrast. Sagittal and coronal reconstructions were performed.  Automated exposure control and iterative reconstruction  methods were used.    Findings:    Lower thorax: Bibasilar scarring/subsegmental atelectasis. Benign calcified granuloma in the left lower lobe. No focal consolidation. Partially visualized bilateral breast implants. Left hemidiaphragm elevation.    Liver: No focal hepatic lesion. Calcified granulomata.    Gallbladder and bile ducts: Cholecystectomy. No abnormal biliary ductal dilatation.    Pancreas: Mild pancreatic ductal dilatation, which per chart review has been evaluated endoscopically. No surrounding inflammation.    Spleen: Normal in size.    Adrenal glands: No adrenal nodule.    Kidneys: Symmetric in size and enhancement. No hydronephrosis. Left-sided nonobstructing nephrolithiasis.    Urinary bladder: Unremarkable.    Reproductive organs: Hysterectomy.    Stomach and bowel: Postsurgical changes of gastric bypass. Double pigtail stent in place across a gastroenteric  fistula. Formed stool throughout the colon with fecalization of distal small bowel contents, suggestive of constipation. No evidence of bowel  obstruction.    Lymph nodes: No pathologically enlarged lymph nodes.    Vessels: No abdominal aortic aneurysm. Major vasculature is patent. Atherosclerosis.    Peritoneum and retroperitoneum: No free air or free fluid.    Soft tissues: Stimulator device in the subcutaneous tissues of the left intra-abdominal wall..    Osseous structures: No acute or suspicious osseous lesions. L3-L5 posterior fusion with interbody spacers    Impression  Impression:    Findings suggestive of constipation. No evidence of bowel obstruction.    Postsurgical changes of gastric bypass with double pigtail stent across a gastroenteric fistula.    Left-sided nonobstructing nephrolithiasis.    Additional ancillary/nonemergent findings, as above.      Electronically Signed: Durga Hood MD  3/13/2024 7:57 PM EDT  Workstation ID: [id number]      Results for orders placed during the hospital encounter of 03/13/24    CT Abdomen Pelvis With Contrast    Narrative  CT ABDOMEN PELVIS W CONTRAST    Date of Exam: 3/13/2024 7:18 PM EDT    Indication: Left lower quadrant and left flank pain.    Comparison: CT abdomen pelvis 12/28/2018.    Technique: Axial CT images were obtained of the abdomen and pelvis following the uneventful intravenous administration of iodinated contrast. Sagittal and coronal reconstructions were performed.  Automated exposure control and iterative reconstruction  methods were used.    Findings:    Lower thorax: Bibasilar scarring/subsegmental atelectasis. Benign calcified granuloma in the left lower lobe. No focal consolidation. Partially visualized bilateral breast implants. Left hemidiaphragm elevation.    Liver: No focal hepatic lesion. Calcified granulomata.    Gallbladder and bile ducts: Cholecystectomy. No abnormal biliary ductal dilatation.    Pancreas: Mild pancreatic ductal  dilatation, which per chart review has been evaluated endoscopically. No surrounding inflammation.    Spleen: Normal in size.    Adrenal glands: No adrenal nodule.    Kidneys: Symmetric in size and enhancement. No hydronephrosis. Left-sided nonobstructing nephrolithiasis.    Urinary bladder: Unremarkable.    Reproductive organs: Hysterectomy.    Stomach and bowel: Postsurgical changes of gastric bypass. 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 Formed stool throughout the colon with fecalization of distal small bowel contents, suggestive of constipation. No evidence of bowel  obstruction.    Lymph nodes: No pathologically enlarged lymph nodes.    Vessels: No abdominal aortic aneurysm. Major vasculature is patent. Atherosclerosis.    Peritoneum and retroperitoneum: No free air or free fluid.    Soft tissues: Stimulator device in the subcutaneous tissues of the left intra-abdominal wall..    Osseous structures: No acute or suspicious osseous lesions. L3-L5 posterior fusion with interbody spacers    Impression  Impression:    Findings suggestive of constipation. No evidence of bowel obstruction.    Postsurgical changes of gastric bypass with double pigtail stent across a gastroenteric fistula.    Left-sided nonobstructing nephrolithiasis.    Additional ancillary/nonemergent findings, as above.      Electronically Signed: Durga Hood MD  3/13/2024 7:57 PM EDT  Workstation ID: [id number]      Lab Review:   Admission on 03/13/2024, Discharged on 03/13/2024   Component Date Value    Color, UA 03/13/2024 Yellow     Appearance, UA 03/13/2024 Clear     pH, UA 03/13/2024 5.5     Specific Gravity, UA 03/13/2024 1.025     Glucose, UA 03/13/2024 Negative     Ketones, UA 03/13/2024 Negative     Bilirubin, UA 03/13/2024 Negative     Blood, UA 03/13/2024 Negative     Protein, UA 03/13/2024 Negative     Leuk Esterase, UA 03/13/2024 Trace (A)     Nitrite, UA 03/13/2024 Negative     Urobilinogen, UA 03/13/2024 0.2  E.U./dL     Glucose 03/13/2024 106 (H)     BUN 03/13/2024 23     Creatinine 03/13/2024 0.98     Sodium 03/13/2024 136     Potassium 03/13/2024 4.7     Chloride 03/13/2024 103     CO2 03/13/2024 24.7     Calcium 03/13/2024 8.5 (L)     Total Protein 03/13/2024 7.0     Albumin 03/13/2024 3.7     ALT (SGPT) 03/13/2024 17     AST (SGOT) 03/13/2024 16     Alkaline Phosphatase 03/13/2024 107     Total Bilirubin 03/13/2024 0.4     Globulin 03/13/2024 3.3     A/G Ratio 03/13/2024 1.1     BUN/Creatinine Ratio 03/13/2024 23.5     Anion Gap 03/13/2024 8.3     eGFR 03/13/2024 61.1     Lipase 03/13/2024 12 (L)     WBC 03/13/2024 5.88     RBC 03/13/2024 4.13     Hemoglobin 03/13/2024 11.1 (L)     Hematocrit 03/13/2024 36.5     MCV 03/13/2024 88.4     MCH 03/13/2024 26.9     MCHC 03/13/2024 30.4 (L)     RDW 03/13/2024 14.1     RDW-SD 03/13/2024 45.7     MPV 03/13/2024 9.1     Platelets 03/13/2024 286     Neutrophil % 03/13/2024 47.2     Lymphocyte % 03/13/2024 37.9     Monocyte % 03/13/2024 8.3     Eosinophil % 03/13/2024 4.9     Basophil % 03/13/2024 1.0     Immature Grans % 03/13/2024 0.7 (H)     Neutrophils, Absolute 03/13/2024 2.77     Lymphocytes, Absolute 03/13/2024 2.23     Monocytes, Absolute 03/13/2024 0.49     Eosinophils, Absolute 03/13/2024 0.29     Basophils, Absolute 03/13/2024 0.06     Immature Grans, Absolute 03/13/2024 0.04     RBC, UA 03/13/2024 0-2     WBC, UA 03/13/2024 3-5 (A)     Bacteria, UA 03/13/2024 None Seen     Squamous Epithelial Cell* 03/13/2024 0-2     Hyaline Casts, UA 03/13/2024 0-2     Methodology 03/13/2024 Automated Microscopy      Recent labs reviewed and interpreted for clinical significance and application            Level of Care:           Jay Shrestha MD  04/14/24  .

## 2024-04-22 ENCOUNTER — HOSPITAL ENCOUNTER (EMERGENCY)
Facility: HOSPITAL | Age: 74
Discharge: NURSING, REHAB OR LONG-TERM CARE | End: 2024-04-22
Attending: EMERGENCY MEDICINE | Admitting: EMERGENCY MEDICINE
Payer: MEDICARE

## 2024-04-22 ENCOUNTER — APPOINTMENT (OUTPATIENT)
Dept: CT IMAGING | Facility: HOSPITAL | Age: 74
End: 2024-04-22
Payer: MEDICARE

## 2024-04-22 VITALS
WEIGHT: 127.87 LBS | DIASTOLIC BLOOD PRESSURE: 71 MMHG | HEIGHT: 60 IN | RESPIRATION RATE: 18 BRPM | TEMPERATURE: 99.4 F | HEART RATE: 72 BPM | OXYGEN SATURATION: 96 % | BODY MASS INDEX: 25.1 KG/M2 | SYSTOLIC BLOOD PRESSURE: 162 MMHG

## 2024-04-22 DIAGNOSIS — I10 HYPERTENSION, UNSPECIFIED TYPE: Primary | ICD-10-CM

## 2024-04-22 LAB
ANION GAP SERPL CALCULATED.3IONS-SCNC: 10 MMOL/L (ref 5–15)
APTT PPP: 41.6 SECONDS (ref 61–76.5)
BASOPHILS # BLD AUTO: 0.06 10*3/MM3 (ref 0–0.2)
BASOPHILS NFR BLD AUTO: 0.7 % (ref 0–1.5)
BUN SERPL-MCNC: 16 MG/DL (ref 8–23)
BUN/CREAT SERPL: 15 (ref 7–25)
CALCIUM SPEC-SCNC: 9.3 MG/DL (ref 8.6–10.5)
CHLORIDE SERPL-SCNC: 101 MMOL/L (ref 98–107)
CO2 SERPL-SCNC: 27 MMOL/L (ref 22–29)
CREAT SERPL-MCNC: 1.07 MG/DL (ref 0.57–1)
DEPRECATED RDW RBC AUTO: 45.5 FL (ref 37–54)
EGFRCR SERPLBLD CKD-EPI 2021: 55 ML/MIN/1.73
EOSINOPHIL # BLD AUTO: 0.02 10*3/MM3 (ref 0–0.4)
EOSINOPHIL NFR BLD AUTO: 0.2 % (ref 0.3–6.2)
ERYTHROCYTE [DISTWIDTH] IN BLOOD BY AUTOMATED COUNT: 15.1 % (ref 12.3–15.4)
GLUCOSE BLDC GLUCOMTR-MCNC: 164 MG/DL (ref 70–105)
GLUCOSE SERPL-MCNC: 147 MG/DL (ref 65–99)
HCT VFR BLD AUTO: 35.4 % (ref 34–46.6)
HGB BLD-MCNC: 11.6 G/DL (ref 12–15.9)
IMM GRANULOCYTES # BLD AUTO: 0.04 10*3/MM3 (ref 0–0.05)
IMM GRANULOCYTES NFR BLD AUTO: 0.4 % (ref 0–0.5)
INR PPP: 0.98 (ref 0.93–1.1)
LYMPHOCYTES # BLD AUTO: 2.48 10*3/MM3 (ref 0.7–3.1)
LYMPHOCYTES NFR BLD AUTO: 27.7 % (ref 19.6–45.3)
MCH RBC QN AUTO: 27.3 PG (ref 26.6–33)
MCHC RBC AUTO-ENTMCNC: 32.8 G/DL (ref 31.5–35.7)
MCV RBC AUTO: 83.3 FL (ref 79–97)
MONOCYTES # BLD AUTO: 0.84 10*3/MM3 (ref 0.1–0.9)
MONOCYTES NFR BLD AUTO: 9.4 % (ref 5–12)
NEUTROPHILS NFR BLD AUTO: 5.52 10*3/MM3 (ref 1.7–7)
NEUTROPHILS NFR BLD AUTO: 61.6 % (ref 42.7–76)
NRBC BLD AUTO-RTO: 0 /100 WBC (ref 0–0.2)
PLATELET # BLD AUTO: 321 10*3/MM3 (ref 140–450)
PMV BLD AUTO: 9.4 FL (ref 6–12)
POTASSIUM SERPL-SCNC: 3.3 MMOL/L (ref 3.5–5.2)
PROTHROMBIN TIME: 10.7 SECONDS (ref 9.6–11.7)
RBC # BLD AUTO: 4.25 10*6/MM3 (ref 3.77–5.28)
SODIUM SERPL-SCNC: 138 MMOL/L (ref 136–145)
TSH SERPL DL<=0.05 MIU/L-ACNC: 0.59 UIU/ML (ref 0.27–4.2)
WBC NRBC COR # BLD AUTO: 8.96 10*3/MM3 (ref 3.4–10.8)

## 2024-04-22 PROCEDURE — 99284 EMERGENCY DEPT VISIT MOD MDM: CPT

## 2024-04-22 PROCEDURE — 36415 COLL VENOUS BLD VENIPUNCTURE: CPT

## 2024-04-22 PROCEDURE — 82948 REAGENT STRIP/BLOOD GLUCOSE: CPT

## 2024-04-22 PROCEDURE — 84443 ASSAY THYROID STIM HORMONE: CPT | Performed by: EMERGENCY MEDICINE

## 2024-04-22 PROCEDURE — 85730 THROMBOPLASTIN TIME PARTIAL: CPT | Performed by: EMERGENCY MEDICINE

## 2024-04-22 PROCEDURE — 80048 BASIC METABOLIC PNL TOTAL CA: CPT | Performed by: EMERGENCY MEDICINE

## 2024-04-22 PROCEDURE — 85025 COMPLETE CBC W/AUTO DIFF WBC: CPT | Performed by: EMERGENCY MEDICINE

## 2024-04-22 PROCEDURE — 70450 CT HEAD/BRAIN W/O DYE: CPT

## 2024-04-22 PROCEDURE — 85610 PROTHROMBIN TIME: CPT | Performed by: EMERGENCY MEDICINE

## 2024-04-22 RX ORDER — SODIUM CHLORIDE 0.9 % (FLUSH) 0.9 %
10 SYRINGE (ML) INJECTION AS NEEDED
Status: DISCONTINUED | OUTPATIENT
Start: 2024-04-22 | End: 2024-04-22 | Stop reason: HOSPADM

## 2024-04-22 NOTE — ED PROVIDER NOTES
Subjective   History of Present Illness  Chief complaint: High blood pressure    73-year-old female presents with high blood pressure.  Patient states that has been running high in the last couple days.  She has been taking her medicine but cannot get it to go down.  She states her blood pressure was 200/100 this morning.  She decided to come to the emergency room.  After arriving to the emergency room she states she has had some mild tingling in the left arm.  She denies any other numbness, weakness, tingling.  She has had no headache.  She denies any speech difficulty or vision changes.  She denies any alleviating or chest pain factors.    History provided by:  Patient      Review of Systems   Constitutional:  Negative for fever.   HENT:  Negative for congestion.    Respiratory:  Negative for cough and shortness of breath.    Cardiovascular:  Negative for chest pain.   Gastrointestinal:  Negative for abdominal pain, diarrhea and vomiting.   Musculoskeletal:  Negative for back pain.   Neurological:  Negative for weakness, numbness and headaches.   Psychiatric/Behavioral:  Negative for confusion.        Past Medical History:   Diagnosis Date    Breast cancer     Diabetes mellitus     diet controlled    Disease of thyroid gland     GERD (gastroesophageal reflux disease)     History of transfusion     MI (myocardial infarction)     Sleep apnea        Allergies   Allergen Reactions    Lansoprazole Hives    Propoxyphene GI Intolerance    Tape Rash    Valium [Diazepam] Anxiety       Past Surgical History:   Procedure Laterality Date    BACK SURGERY      multiple    CHOLECYSTECTOMY      COLONOSCOPY N/A 07/22/2022    Procedure: COLONOSCOPY;  Surgeon: Chacha Clark MD;  Location: [facility] ENDOSCOPY;  Service: Gastroenterology;  Laterality: N/A;    DILATATION AND CURETTAGE      multiple    ENDOSCOPY N/A 07/22/2022    Procedure: ESOPHAGOGASTRODUODENOSCOPY;  Surgeon: Chacha Clark MD;  Location: [facility] ENDOSCOPY;   "Service: Gastroenterology;  Laterality: N/A;    ERCP N/A 8/16/2022    Procedure: ENDOSCOPIC RETROGRADE CHOLANGIOPANCREATOGRAPHY;  Surgeon: Chacha Clark MD;  Location: [facility] ENDOSCOPY;  Service: Gastroenterology;  Laterality: N/A;    GASTRIC BYPASS      HYSTERECTOMY      KNEE SURGERY Bilateral     multiple    MASTECTOMY Bilateral     NECK SURGERY      multiple    RECTOCELE REPAIR      multiple       Family History   Problem Relation Age of Onset    Thyroid disease Other     Diabetes Other     Cancer Other     Heart disease Other        Social History     Socioeconomic History    Marital status:    Tobacco Use    Smoking status: Never    Smokeless tobacco: Never   Vaping Use    Vaping status: Never Used   Substance and Sexual Activity    Alcohol use: Never    Drug use: Never    Sexual activity: Defer       /71   Pulse 72   Temp 99.4 °F (37.4 °C) (Oral)   Resp 18   Ht 152.4 cm (60\")   Wt 58 kg (127 lb 13.9 oz)   SpO2 96%   BMI 24.97 kg/m²       Objective   Physical Exam  Vitals and nursing note reviewed.   Constitutional:       Appearance: Normal appearance.   HENT:      Head: Normocephalic and atraumatic.      Mouth/Throat:      Mouth: Mucous membranes are moist.   Cardiovascular:      Rate and Rhythm: Normal rate and regular rhythm.      Heart sounds: Normal heart sounds.   Pulmonary:      Effort: Pulmonary effort is normal. No respiratory distress.      Breath sounds: Normal breath sounds.   Abdominal:      Palpations: Abdomen is soft.      Tenderness: There is no abdominal tenderness.   Skin:     General: Skin is warm and dry.   Neurological:      General: No focal deficit present.      Mental Status: She is alert and oriented to person, place, and time.      Comments: No focal motor or sensory deficit appreciated.  There is no facial droop.  There is no dysarthria or aphasia.         Procedures           ED Course      Results for orders placed or performed during the hospital encounter " of 04/22/24   Basic Metabolic Panel    Specimen: Arm, Left; Blood   Result Value Ref Range    Glucose 147 (H) 65 - 99 mg/dL    BUN 16 8 - 23 mg/dL    Creatinine 1.07 (H) 0.57 - 1.00 mg/dL    Sodium 138 136 - 145 mmol/L    Potassium 3.3 (L) 3.5 - 5.2 mmol/L    Chloride 101 98 - 107 mmol/L    CO2 27.0 22.0 - 29.0 mmol/L    Calcium 9.3 8.6 - 10.5 mg/dL    BUN/Creatinine Ratio 15.0 7.0 - 25.0    Anion Gap 10.0 5.0 - 15.0 mmol/L    eGFR 55.0 (L) >60.0 mL/min/1.73   Protime-INR    Specimen: Arm, Left; Blood   Result Value Ref Range    Protime 10.7 9.6 - 11.7 Seconds    INR 0.98 0.93 - 1.10   aPTT    Specimen: Arm, Left; Blood   Result Value Ref Range    PTT 41.6 (L) 61.0 - 76.5 seconds   TSH    Specimen: Arm, Left; Blood   Result Value Ref Range    TSH 0.593 0.270 - 4.200 uIU/mL   CBC Auto Differential    Specimen: Blood   Result Value Ref Range    WBC 8.96 3.40 - 10.80 10*3/mm3    RBC 4.25 3.77 - 5.28 10*6/mm3    Hemoglobin 11.6 (L) 12.0 - 15.9 g/dL    Hematocrit 35.4 34.0 - 46.6 %    MCV 83.3 79.0 - 97.0 fL    MCH 27.3 26.6 - 33.0 pg    MCHC 32.8 31.5 - 35.7 g/dL    RDW 15.1 12.3 - 15.4 %    RDW-SD 45.5 37.0 - 54.0 fl    MPV 9.4 6.0 - 12.0 fL    Platelets 321 140 - 450 10*3/mm3    Neutrophil % 61.6 42.7 - 76.0 %    Lymphocyte % 27.7 19.6 - 45.3 %    Monocyte % 9.4 5.0 - 12.0 %    Eosinophil % 0.2 (L) 0.3 - 6.2 %    Basophil % 0.7 0.0 - 1.5 %    Immature Grans % 0.4 0.0 - 0.5 %    Neutrophils, Absolute 5.52 1.70 - 7.00 10*3/mm3    Lymphocytes, Absolute 2.48 0.70 - 3.10 10*3/mm3    Monocytes, Absolute 0.84 0.10 - 0.90 10*3/mm3    Eosinophils, Absolute 0.02 0.00 - 0.40 10*3/mm3    Basophils, Absolute 0.06 0.00 - 0.20 10*3/mm3    Immature Grans, Absolute 0.04 0.00 - 0.05 10*3/mm3    nRBC 0.0 0.0 - 0.2 /100 WBC   POC Glucose Once    Specimen: Blood   Result Value Ref Range    Glucose 164 (H) 70 - 105 mg/dL     CT Head Without Contrast    Result Date: 4/22/2024  No acute intracranial abnormality. White matter changes  compatible small vessel ischemic disease in this age group Mild paranasal sinus disease Electronically Signed: Manuel Mandel MD  4/22/2024 3:35 PM EDT  Workstation ID: [id number]                                          Medical Decision Making  Amount and/or Complexity of Data Reviewed  Labs: ordered.  Radiology: ordered.    Risk  Prescription drug management.      Patient had the above evaluation.  Results were discussed with patient.  CT head showed no acute intracranial abnormality.  White blood cell count is normal.  BMP is unremarkable.  TSH is normal.  Patient's blood pressure has been running in the 160s systolic.  She has a normal neurologic exam.  I see no indication for admission at this time.  She will be discharged to follow-up with her primary doctor for ongoing blood pressure management.  Patient is agreeable with this plan.      Final diagnoses:   Hypertension, unspecified type       ED Disposition  ED Disposition       ED Disposition   Discharge    Condition   Stable    Comment   --               Yun Newman MD  [address] [place] [facility]  [phone]    Call in 2 days           Medication List      No changes were made to your prescriptions during this visit.            Demetri Cesar MD  04/22/24 8170

## 2024-04-22 NOTE — DISCHARGE INSTRUCTIONS
Follow-up with your primary doctor for ongoing blood pressure management.  Return to the emergency room for any new or worsening symptoms or if you have any other questions or concerns.

## 2024-04-29 ENCOUNTER — HOSPITAL ENCOUNTER (OUTPATIENT)
Dept: CARDIOLOGY | Facility: HOSPITAL | Age: 74
Discharge: HOME | End: 2024-04-29
Payer: MEDICARE

## 2024-04-29 ENCOUNTER — HOSPITAL ENCOUNTER (OUTPATIENT)
Dept: NUCLEAR MEDICINE | Facility: HOSPITAL | Age: 74
Discharge: HOME | End: 2024-04-29
Payer: MEDICARE

## 2024-04-29 DIAGNOSIS — R60.9 SWELLING: ICD-10-CM

## 2024-04-29 DIAGNOSIS — R06.09 DOE (DYSPNEA ON EXERTION): ICD-10-CM

## 2024-04-29 DIAGNOSIS — R94.31 ABNORMAL EKG: ICD-10-CM

## 2024-04-29 PROCEDURE — 93306 TTE W/DOPPLER COMPLETE: CPT

## 2024-04-29 PROCEDURE — 25010000002 SULFUR HEXAFLUORIDE MICROSPH 60.7-25 MG RECONSTITUTED SUSPENSION: Performed by: INTERNAL MEDICINE

## 2024-04-29 PROCEDURE — 25010000002 REGADENOSON 0.4 MG/5ML SOLUTION: Performed by: INTERNAL MEDICINE

## 2024-04-29 PROCEDURE — 78452 HT MUSCLE IMAGE SPECT MULT: CPT

## 2024-04-29 PROCEDURE — A9502 TC99M TETROFOSMIN: HCPCS | Performed by: INTERNAL MEDICINE

## 2024-04-29 PROCEDURE — 0 TECHNETIUM TETROFOSMIN KIT: Performed by: INTERNAL MEDICINE

## 2024-04-29 PROCEDURE — 93017 CV STRESS TEST TRACING ONLY: CPT

## 2024-04-29 RX ORDER — REGADENOSON 0.08 MG/ML
0.4 INJECTION, SOLUTION INTRAVENOUS
Status: COMPLETED | OUTPATIENT
Start: 2024-04-29 | End: 2024-04-29

## 2024-04-29 RX ADMIN — TETROFOSMIN 1 DOSE: 1.38 INJECTION, POWDER, LYOPHILIZED, FOR SOLUTION INTRAVENOUS at 12:50

## 2024-04-29 RX ADMIN — TETROFOSMIN 1 DOSE: 1.38 INJECTION, POWDER, LYOPHILIZED, FOR SOLUTION INTRAVENOUS at 13:46

## 2024-04-29 RX ADMIN — REGADENOSON 0.4 MG: 0.08 INJECTION, SOLUTION INTRAVENOUS at 13:46

## 2024-04-29 RX ADMIN — SULFUR HEXAFLUORIDE 2 ML: KIT at 15:19

## 2024-05-01 LAB
AORTIC DIMENSIONLESS INDEX: 0.84 (DI)
BH CV ECHO MEAS - ACS: 1.7 CM
BH CV ECHO MEAS - AO MAX PG: 3.8 MMHG
BH CV ECHO MEAS - AO MEAN PG: 2 MMHG
BH CV ECHO MEAS - AO V2 MAX: 97.5 CM/SEC
BH CV ECHO MEAS - AO V2 VTI: 20.3 CM
BH CV ECHO MEAS - EDV(CUBED): 35.9 ML
BH CV ECHO MEAS - EDV(MOD-SP4): 65.2 ML
BH CV ECHO MEAS - EF(MOD-SP4): 58 %
BH CV ECHO MEAS - ESV(CUBED): 9.3 ML
BH CV ECHO MEAS - ESV(MOD-SP4): 27.4 ML
BH CV ECHO MEAS - FS: 36.4 %
BH CV ECHO MEAS - IVS/LVPW: 0.88 CM
BH CV ECHO MEAS - IVSD: 0.7 CM
BH CV ECHO MEAS - LA DIMENSION: 2.7 CM
BH CV ECHO MEAS - LAT PEAK E' VEL: 6 CM/SEC
BH CV ECHO MEAS - LV DIASTOLIC VOL/BSA (35-75): 42.4 CM2
BH CV ECHO MEAS - LV MASS(C)D: 62.7 GRAMS
BH CV ECHO MEAS - LV MAX PG: 2.7 MMHG
BH CV ECHO MEAS - LV MEAN PG: 2 MMHG
BH CV ECHO MEAS - LV SYSTOLIC VOL/BSA (12-30): 17.8 CM2
BH CV ECHO MEAS - LV V1 MAX: 82.2 CM/SEC
BH CV ECHO MEAS - LV V1 VTI: 17.6 CM
BH CV ECHO MEAS - LVIDD: 3.3 CM
BH CV ECHO MEAS - LVIDS: 2.1 CM
BH CV ECHO MEAS - LVPWD: 0.8 CM
BH CV ECHO MEAS - MED PEAK E' VEL: 7 CM/SEC
BH CV ECHO MEAS - MV A MAX VEL: 119 CM/SEC
BH CV ECHO MEAS - MV DEC SLOPE: 256.5 CM/SEC2
BH CV ECHO MEAS - MV DEC TIME: 0.31 SEC
BH CV ECHO MEAS - MV E MAX VEL: 75.5 CM/SEC
BH CV ECHO MEAS - MV E/A: 0.63
BH CV ECHO MEAS - MV MAX PG: 5.2 MMHG
BH CV ECHO MEAS - MV MEAN PG: 2 MMHG
BH CV ECHO MEAS - MV P1/2T: 91.6 MSEC
BH CV ECHO MEAS - MV V2 VTI: 27.6 CM
BH CV ECHO MEAS - MVA(P1/2T): 2.4 CM2
BH CV ECHO MEAS - PA V2 MAX: 57.6 CM/SEC
BH CV ECHO MEAS - RVDD: 2.3 CM
BH CV ECHO MEAS - RVOT DIAM: 1.2 CM
BH CV ECHO MEAS - SV(MOD-SP4): 37.8 ML
BH CV ECHO MEAS - SVI(MOD-SP4): 24.6 ML/M2
BH CV ECHO MEASUREMENTS AVERAGE E/E' RATIO: 11.62
BH CV REST NUCLEAR ISOTOPE DOSE: 10.7 MCI
BH CV STRESS BP STAGE 1: NORMAL
BH CV STRESS BP STAGE 2: NORMAL
BH CV STRESS COMMENTS STAGE 1: NORMAL
BH CV STRESS COMMENTS STAGE 2: NORMAL
BH CV STRESS DOSE REGADENOSON STAGE 1: 0.4
BH CV STRESS DURATION MIN STAGE 1: 0
BH CV STRESS DURATION MIN STAGE 2: 4
BH CV STRESS DURATION SEC STAGE 1: 10
BH CV STRESS DURATION SEC STAGE 2: 0
BH CV STRESS HR STAGE 1: 83
BH CV STRESS HR STAGE 2: 92
BH CV STRESS NUCLEAR ISOTOPE DOSE: 33 MCI
BH CV STRESS PROTOCOL 1: NORMAL
BH CV STRESS RECOVERY BP: NORMAL MMHG
BH CV STRESS RECOVERY HR: 87 BPM
BH CV STRESS STAGE 1: 1
BH CV STRESS STAGE 2: 2
LEFT ATRIUM VOLUME INDEX: 23.7 ML/M2
LV EF NUC BP: 78 %
MAXIMAL PREDICTED HEART RATE: 147 BPM
PERCENT MAX PREDICTED HR: 62.59 %
SINUS: 3 CM
STRESS BASELINE BP: NORMAL MMHG
STRESS BASELINE HR: 58 BPM
STRESS PERCENT HR: 74 %
STRESS POST PEAK BP: NORMAL MMHG
STRESS POST PEAK HR: 92 BPM
STRESS TARGET HR: 125 BPM

## 2024-05-10 ENCOUNTER — TELEMEDICINE (OUTPATIENT)
Dept: CARDIOLOGY | Facility: CLINIC | Age: 74
End: 2024-05-10
Payer: MEDICARE

## 2024-05-10 VITALS — HEART RATE: 71 BPM | DIASTOLIC BLOOD PRESSURE: 71 MMHG | SYSTOLIC BLOOD PRESSURE: 179 MMHG

## 2024-05-10 DIAGNOSIS — R06.09 DOE (DYSPNEA ON EXERTION): Primary | ICD-10-CM

## 2024-05-10 DIAGNOSIS — I10 ESSENTIAL (PRIMARY) HYPERTENSION: ICD-10-CM

## 2024-05-10 DIAGNOSIS — Z00.00 PREVENTATIVE HEALTH CARE: ICD-10-CM

## 2024-05-10 DIAGNOSIS — R60.9 SWELLING: ICD-10-CM

## 2024-05-10 DIAGNOSIS — R94.31 ABNORMAL EKG: ICD-10-CM

## 2024-05-10 RX ORDER — LOSARTAN POTASSIUM 50 MG/1
50 TABLET ORAL
COMMUNITY
Start: 2024-04-25

## 2024-05-20 ENCOUNTER — TELEPHONE (OUTPATIENT)
Dept: PAIN MEDICINE | Facility: CLINIC | Age: 74
End: 2024-05-20
Payer: MEDICARE

## 2024-05-20 NOTE — TELEPHONE ENCOUNTER
Caller: Ariana Reid    Relationship to patient: Self    Best call back number: [phone] (home)     Patient is needing: MS REID WOULD LIKE TO  HER RECORDS TOMORROW THAT SHE DROPPED OFF WHEN SHE WAS REFERRED TO DR MARTINEZ

## 2024-09-13 ENCOUNTER — TELEPHONE (OUTPATIENT)
Dept: CARDIOLOGY | Facility: CLINIC | Age: 74
End: 2024-09-13

## 2024-09-13 NOTE — TELEPHONE ENCOUNTER
Caller: NATHANIEL    Relationship to patient: [facility] PRE-ADMISSIONS     Best call back number: [phone]     Patient is needing: NATHANIEL SAID THEY FAXED OVER [facility] ON THE 9TH- DIDN'T SEE ANYTHING IN MEDIA SECTION BUT NATHANIEL SAID THEY WOULD RE-FAX. PT IS HAVING PAIN PUMP REMOVAL AND THEY NEED RISK STATEMENT AND FOR DR CHANG TO ADVISE IF ANY CARDIAC TESTING IS NEEDED PRIOR TO SURGERY. SURGERY IS 9.20.24. [facility] FAX- [phone]

## 2024-09-16 ENCOUNTER — OFFICE VISIT (OUTPATIENT)
Dept: NEUROLOGY | Facility: CLINIC | Age: 74
End: 2024-09-16
Payer: MEDICARE

## 2024-09-16 VITALS
BODY MASS INDEX: 25.72 KG/M2 | WEIGHT: 131 LBS | SYSTOLIC BLOOD PRESSURE: 132 MMHG | DIASTOLIC BLOOD PRESSURE: 71 MMHG | HEART RATE: 90 BPM | HEIGHT: 60 IN

## 2024-09-16 DIAGNOSIS — G43.009 MIGRAINE WITHOUT AURA AND WITHOUT STATUS MIGRAINOSUS, NOT INTRACTABLE: ICD-10-CM

## 2024-09-16 DIAGNOSIS — G25.81 RESTLESS LEGS SYNDROME (RLS): ICD-10-CM

## 2024-09-16 DIAGNOSIS — G47.33 OSA (OBSTRUCTIVE SLEEP APNEA): ICD-10-CM

## 2024-09-16 DIAGNOSIS — R41.3 MEMORY LOSS: Primary | ICD-10-CM

## 2024-09-16 PROBLEM — K21.9 GASTROESOPHAGEAL REFLUX DISEASE: Status: ACTIVE | Noted: 2024-09-16

## 2024-09-16 PROBLEM — M51.36 DDD (DEGENERATIVE DISC DISEASE), LUMBAR: Status: ACTIVE | Noted: 2017-06-01

## 2024-09-16 PROBLEM — I10 HYPERTENSION: Status: ACTIVE | Noted: 2019-03-01

## 2024-09-16 PROBLEM — G89.29 CHRONIC LOW BACK PAIN: Status: ACTIVE | Noted: 2017-06-21

## 2024-09-16 PROBLEM — E11.9 TYPE 2 DIABETES MELLITUS: Status: ACTIVE | Noted: 2024-09-16

## 2024-09-16 PROBLEM — M51.369 DDD (DEGENERATIVE DISC DISEASE), LUMBAR: Status: ACTIVE | Noted: 2017-06-01

## 2024-09-16 PROBLEM — F41.9 ANXIETY DISORDER: Status: ACTIVE | Noted: 2023-07-06

## 2024-09-16 PROBLEM — M54.50 CHRONIC LOW BACK PAIN: Status: ACTIVE | Noted: 2017-06-21

## 2024-09-16 PROBLEM — Z80.3 FAMILY HISTORY OF MALIGNANT NEOPLASM OF BREAST: Status: ACTIVE | Noted: 2024-09-16

## 2024-09-16 PROCEDURE — 1160F RVW MEDS BY RX/DR IN RCRD: CPT | Performed by: PSYCHIATRY & NEUROLOGY

## 2024-09-16 PROCEDURE — 1159F MED LIST DOCD IN RCRD: CPT | Performed by: PSYCHIATRY & NEUROLOGY

## 2024-09-16 PROCEDURE — 3078F DIAST BP <80 MM HG: CPT | Performed by: PSYCHIATRY & NEUROLOGY

## 2024-09-16 PROCEDURE — 99204 OFFICE O/P NEW MOD 45 MIN: CPT | Performed by: PSYCHIATRY & NEUROLOGY

## 2024-09-16 PROCEDURE — 3075F SYST BP GE 130 - 139MM HG: CPT | Performed by: PSYCHIATRY & NEUROLOGY

## 2024-09-16 RX ORDER — PLECANATIDE 3 MG/1
3 TABLET ORAL DAILY
COMMUNITY
Start: 2024-06-25

## 2024-09-17 NOTE — TELEPHONE ENCOUNTER
TYREE CALLING IN FROM [facility] CHECKING ON UPDATES SINCE SURGERY IS SCHEDULED FOR FRIDAY. OFFICE ADVISED ME TO LEAVE FU NOTE AND SOMEONE WOULD GET BACK WITH [facility].

## 2024-09-20 ENCOUNTER — PATIENT ROUNDING (BHMG ONLY) (OUTPATIENT)
Dept: NEUROLOGY | Facility: CLINIC | Age: 74
End: 2024-09-20
Payer: MEDICARE

## 2024-10-01 ENCOUNTER — HOSPITAL ENCOUNTER (OUTPATIENT)
Dept: SLEEP MEDICINE | Facility: HOSPITAL | Age: 74
End: 2024-10-01
Payer: MEDICARE

## 2024-10-01 DIAGNOSIS — G47.33 OSA (OBSTRUCTIVE SLEEP APNEA): ICD-10-CM

## 2024-10-01 PROCEDURE — 95806 SLEEP STUDY UNATT&RESP EFFT: CPT

## 2024-10-28 ENCOUNTER — HOSPITAL ENCOUNTER (EMERGENCY)
Facility: HOSPITAL | Age: 74
Discharge: HOME | End: 2024-10-28
Attending: EMERGENCY MEDICINE | Admitting: EMERGENCY MEDICINE
Payer: MEDICARE

## 2024-10-28 ENCOUNTER — APPOINTMENT (OUTPATIENT)
Dept: CT IMAGING | Facility: HOSPITAL | Age: 74
End: 2024-10-28
Payer: MEDICARE

## 2024-10-28 VITALS
TEMPERATURE: 98.2 F | SYSTOLIC BLOOD PRESSURE: 125 MMHG | HEIGHT: 60 IN | DIASTOLIC BLOOD PRESSURE: 70 MMHG | WEIGHT: 129.85 LBS | OXYGEN SATURATION: 95 % | BODY MASS INDEX: 25.49 KG/M2 | RESPIRATION RATE: 17 BRPM | HEART RATE: 81 BPM

## 2024-10-28 DIAGNOSIS — S30.1XXA ABDOMINAL WALL SEROMA, INITIAL ENCOUNTER: Primary | ICD-10-CM

## 2024-10-28 LAB
ALBUMIN SERPL-MCNC: 4.5 G/DL (ref 3.5–5.2)
ALBUMIN/GLOB SERPL: 1.4 G/DL
ALP SERPL-CCNC: 76 U/L (ref 39–117)
ALT SERPL W P-5'-P-CCNC: 36 U/L (ref 1–33)
ANION GAP SERPL CALCULATED.3IONS-SCNC: 9.8 MMOL/L (ref 5–15)
AST SERPL-CCNC: 32 U/L (ref 1–32)
BASOPHILS # BLD AUTO: 0.11 10*3/MM3 (ref 0–0.2)
BASOPHILS NFR BLD AUTO: 1.5 % (ref 0–1.5)
BILIRUB SERPL-MCNC: 0.5 MG/DL (ref 0–1.2)
BUN SERPL-MCNC: 19 MG/DL (ref 8–23)
BUN/CREAT SERPL: 17.9 (ref 7–25)
CALCIUM SPEC-SCNC: 9.6 MG/DL (ref 8.6–10.5)
CHLORIDE SERPL-SCNC: 104 MMOL/L (ref 98–107)
CO2 SERPL-SCNC: 25.2 MMOL/L (ref 22–29)
CREAT SERPL-MCNC: 1.06 MG/DL (ref 0.57–1)
D-LACTATE SERPL-SCNC: 1 MMOL/L (ref 0.3–2)
DEPRECATED RDW RBC AUTO: 46.2 FL (ref 37–54)
EGFRCR SERPLBLD CKD-EPI 2021: 55.2 ML/MIN/1.73
EOSINOPHIL # BLD AUTO: 0.12 10*3/MM3 (ref 0–0.4)
EOSINOPHIL NFR BLD AUTO: 1.6 % (ref 0.3–6.2)
ERYTHROCYTE [DISTWIDTH] IN BLOOD BY AUTOMATED COUNT: 13.4 % (ref 12.3–15.4)
GLOBULIN UR ELPH-MCNC: 3.3 GM/DL
GLUCOSE SERPL-MCNC: 98 MG/DL (ref 65–99)
HCT VFR BLD AUTO: 35.2 % (ref 34–46.6)
HGB BLD-MCNC: 11.2 G/DL (ref 12–15.9)
HOLD SPECIMEN: NORMAL
IMM GRANULOCYTES # BLD AUTO: 0.03 10*3/MM3 (ref 0–0.05)
IMM GRANULOCYTES NFR BLD AUTO: 0.4 % (ref 0–0.5)
LYMPHOCYTES # BLD AUTO: 2.38 10*3/MM3 (ref 0.7–3.1)
LYMPHOCYTES NFR BLD AUTO: 31.5 % (ref 19.6–45.3)
MCH RBC QN AUTO: 30 PG (ref 26.6–33)
MCHC RBC AUTO-ENTMCNC: 31.8 G/DL (ref 31.5–35.7)
MCV RBC AUTO: 94.4 FL (ref 79–97)
MONOCYTES # BLD AUTO: 0.6 10*3/MM3 (ref 0.1–0.9)
MONOCYTES NFR BLD AUTO: 7.9 % (ref 5–12)
NEUTROPHILS NFR BLD AUTO: 4.32 10*3/MM3 (ref 1.7–7)
NEUTROPHILS NFR BLD AUTO: 57.1 % (ref 42.7–76)
NRBC BLD AUTO-RTO: 0 /100 WBC (ref 0–0.2)
PLATELET # BLD AUTO: 276 10*3/MM3 (ref 140–450)
PMV BLD AUTO: 9.4 FL (ref 6–12)
POTASSIUM SERPL-SCNC: 4.3 MMOL/L (ref 3.5–5.2)
PROT SERPL-MCNC: 7.8 G/DL (ref 6–8.5)
RBC # BLD AUTO: 3.73 10*6/MM3 (ref 3.77–5.28)
SODIUM SERPL-SCNC: 139 MMOL/L (ref 136–145)
WBC NRBC COR # BLD AUTO: 7.56 10*3/MM3 (ref 3.4–10.8)
WHOLE BLOOD HOLD COAG: NORMAL

## 2024-10-28 PROCEDURE — 74177 CT ABD & PELVIS W/CONTRAST: CPT

## 2024-10-28 PROCEDURE — 87040 BLOOD CULTURE FOR BACTERIA: CPT | Performed by: EMERGENCY MEDICINE

## 2024-10-28 PROCEDURE — 80053 COMPREHEN METABOLIC PANEL: CPT | Performed by: EMERGENCY MEDICINE

## 2024-10-28 PROCEDURE — 83605 ASSAY OF LACTIC ACID: CPT

## 2024-10-28 PROCEDURE — 85025 COMPLETE CBC W/AUTO DIFF WBC: CPT | Performed by: EMERGENCY MEDICINE

## 2024-10-28 PROCEDURE — 36415 COLL VENOUS BLD VENIPUNCTURE: CPT

## 2024-10-28 PROCEDURE — 99285 EMERGENCY DEPT VISIT HI MDM: CPT

## 2024-10-28 PROCEDURE — 25510000001 IOPAMIDOL PER 1 ML: Performed by: EMERGENCY MEDICINE

## 2024-10-28 RX ORDER — MELOXICAM 15 MG/1
15 TABLET ORAL DAILY
Qty: 10 TABLET | Refills: 0 | Status: SHIPPED | OUTPATIENT
Start: 2024-10-28

## 2024-10-28 RX ORDER — IOPAMIDOL 755 MG/ML
100 INJECTION, SOLUTION INTRAVASCULAR
Status: COMPLETED | OUTPATIENT
Start: 2024-10-28 | End: 2024-10-28

## 2024-10-28 RX ADMIN — IOPAMIDOL 100 ML: 755 INJECTION, SOLUTION INTRAVENOUS at 17:34

## 2024-10-28 NOTE — DISCHARGE INSTRUCTIONS
Medication as directed  Avoid lifting greater than 10 pounds deep bending squatting or stooping.  He can also use Tylenol as needed for discomfort  Consider use of abdominal binder but discontinue if you become short of breath or develop a cough

## 2024-10-28 NOTE — ED PROVIDER NOTES
Subjective   History of Present Illness  74-year-old complaining of swelling to her abdominal wall in the area of a previous morphine pump that had been removed.  The patient reports that she has had no definite fever or chills but has had increased swelling recently.  She states was using a belly band but had taken it off.  She reports no cough or shortness of breath.  She reports no nausea vomiting or diarrhea.  States that after she took morphine she took gabapentin but that made her confused and she quit taking that as well      Review of Systems    Past Medical History:   Diagnosis Date    Breast cancer     Diabetes mellitus     diet controlled    Disease of thyroid gland     GERD (gastroesophageal reflux disease)     History of transfusion     MI (myocardial infarction)     Sleep apnea        Allergies   Allergen Reactions    Lansoprazole Hives    Propoxyphene GI Intolerance    Tape Rash    Valium [Diazepam] Anxiety       Past Surgical History:   Procedure Laterality Date    BACK SURGERY      multiple    CHOLECYSTECTOMY      COLONOSCOPY N/A 07/22/2022    Procedure: COLONOSCOPY;  Surgeon: Chacha Clark MD;  Location: [facility] ENDOSCOPY;  Service: Gastroenterology;  Laterality: N/A;    DILATATION AND CURETTAGE      multiple    ENDOSCOPY N/A 07/22/2022    Procedure: ESOPHAGOGASTRODUODENOSCOPY;  Surgeon: Chacha Clark MD;  Location: [facility] ENDOSCOPY;  Service: Gastroenterology;  Laterality: N/A;    ERCP N/A 8/16/2022    Procedure: ENDOSCOPIC RETROGRADE CHOLANGIOPANCREATOGRAPHY;  Surgeon: Chacha Clark MD;  Location: [facility] ENDOSCOPY;  Service: Gastroenterology;  Laterality: N/A;    GASTRIC BYPASS      HYSTERECTOMY      KNEE SURGERY Bilateral     multiple    MASTECTOMY Bilateral     NECK SURGERY      multiple    RECTOCELE REPAIR      multiple       Family History   Problem Relation Age of Onset    Thyroid disease Other     Diabetes Other     Cancer Other     Heart disease Other        Social History      Socioeconomic History    Marital status:    Tobacco Use    Smoking status: Never    Smokeless tobacco: Never   Vaping Use    Vaping status: Never Used   Substance and Sexual Activity    Alcohol use: Never    Drug use: Never    Sexual activity: Defer       Reports no recent unusual food water travel or activity    Objective   Physical Exam  Alert Bebeto Coma Scale 15   HEENT: Pupils equal and reactive to light. Conjunctivae are not injected. Normal tympanic membranes. Oropharynx and nares are normal.   Neck: Supple. Midline trachea. No JVD. No goiter.   Chest: Clear and equal breath sounds bilaterally, regular rate and rhythm without murmur or rub.   Abdomen: Positive bowel sounds, nontender, nondistended. No rebound or peritoneal signs. No CVA tenderness.  There is an area of fluctuance consistent with seroma or hematoma on the patient's left upper quadrant.  The surgical incision is well-healed and there is no evidence of dehiscence or herniation.  There is no cellulitic change or evidence of infection or abscess  Extremities no clubbing. cyanosis or edema. Motor sensory exam is normal. The full range of motion is intact   Skin: Warm and dry, no rashes or petechia.   Lymphatic: No regional lymphadenopathy. No calf pain, swelling or Homans sign    Procedures           ED Course                                   Labs Reviewed   COMPREHENSIVE METABOLIC PANEL - Abnormal; Notable for the following components:       Result Value    Creatinine 1.06 (*)     ALT (SGPT) 36 (*)     eGFR 55.2 (*)     All other components within normal limits    Narrative:     GFR Normal >60  Chronic Kidney Disease <60  Kidney Failure <15    The GFR formula is only valid for adults with stable renal function between ages 18 and 70.   CBC WITH AUTO DIFFERENTIAL - Abnormal; Notable for the following components:    RBC 3.73 (*)     Hemoglobin 11.2 (*)     All other components within normal limits   POC LACTATE - Normal   BLOOD CULTURE    BLOOD CULTURE   POC LACTATE   CBC AND DIFFERENTIAL    Narrative:     The following orders were created for panel order CBC & Differential.  Procedure                               Abnormality         Status                     ---------                               -----------         ------                     CBC Auto Differential[[id number]]        Abnormal            Final result                 Please view results for these tests on the individual orders.   EXTRA TUBES    Narrative:     The following orders were created for panel order Extra Tubes.  Procedure                               Abnormality         Status                     ---------                               -----------         ------                     Gold Top - SST[[id number]]                                   Final result               Light Blue Top[[id number]]                                   Final result                 Please view results for these tests on the individual orders.   GOLD TOP - SST   LIGHT BLUE TOP     .EDMEDS  CT Abdomen Pelvis With Contrast    Result Date: 10/28/2024  Impression: 1.No acute process identified within abdomen/pelvis. 2.7.4 cm fluid collection along left anterior abdominal wall subcutaneous tissues in region of prior pain pump, likely a post-operative seroma. 3.Bilateral nonobstructive renal stones. Electronically Signed: Aftab Aguilar MD  10/28/2024 5:53 PM EDT  Workstation ID: [id number]               Medical Decision Making  The patient will be placed on anti-inflammatory Mobic for the next 10 days.  She is encouraged to follow-up closely with her primary care provider she has had seromatous form apparently in the past after surgery.  The patient was stable at discharge and vocalized understanding of discharge instructions warnings    Amount and/or Complexity of Data Reviewed  Labs: ordered. Decision-making details documented in ED Course.  Radiology: ordered and independent interpretation  performed.    Risk  OTC drugs.  Prescription drug management.        Final diagnoses:   Abdominal wall seroma, initial encounter       ED Disposition  ED Disposition       ED Disposition   Discharge    Condition   Stable    Comment   --               Yun Newman MD  [address] [facility] [address]  [place] [address]  [phone]          General surgery clinic  [phone] expanding area or any problems             Medication List      No changes were made to your prescriptions during this visit.            Reji Coulter MD  10/28/24 2048

## 2024-11-02 LAB
BACTERIA SPEC AEROBE CULT: NORMAL
BACTERIA SPEC AEROBE CULT: NORMAL

## 2024-11-08 ENCOUNTER — TELEPHONE (OUTPATIENT)
Dept: NEUROLOGY | Facility: CLINIC | Age: 74
End: 2024-11-08
Payer: MEDICARE

## 2024-11-08 DIAGNOSIS — G47.33 OSA (OBSTRUCTIVE SLEEP APNEA): Primary | ICD-10-CM

## 2024-11-08 NOTE — TELEPHONE ENCOUNTER
----- Message from Joseph Seipel sent at 10/9/2024  8:28 AM EDT -----  Regarding: Sleep  Set up on CPAP 5-15

## 2024-11-08 NOTE — TELEPHONE ENCOUNTER
Spoke to patient and gave her sleep study results. Advised for her to take daughter or someone with her to set up.

## 2024-11-24 ENCOUNTER — HOSPITAL ENCOUNTER (OUTPATIENT)
Facility: HOSPITAL | Age: 74
Discharge: HOME | End: 2024-11-24
Attending: EMERGENCY MEDICINE | Admitting: EMERGENCY MEDICINE
Payer: MEDICARE

## 2024-11-24 ENCOUNTER — APPOINTMENT (OUTPATIENT)
Dept: GENERAL RADIOLOGY | Facility: HOSPITAL | Age: 74
End: 2024-11-24
Payer: MEDICARE

## 2024-11-24 VITALS
WEIGHT: 127 LBS | BODY MASS INDEX: 24.94 KG/M2 | RESPIRATION RATE: 18 BRPM | HEIGHT: 60 IN | DIASTOLIC BLOOD PRESSURE: 40 MMHG | TEMPERATURE: 98.4 F | SYSTOLIC BLOOD PRESSURE: 93 MMHG | HEART RATE: 84 BPM | OXYGEN SATURATION: 96 %

## 2024-11-24 DIAGNOSIS — J06.9 URI WITH COUGH AND CONGESTION: ICD-10-CM

## 2024-11-24 DIAGNOSIS — J01.90 ACUTE NON-RECURRENT SINUSITIS, UNSPECIFIED LOCATION: Primary | ICD-10-CM

## 2024-11-24 LAB
FLUAV SUBTYP SPEC NAA+PROBE: NOT DETECTED
FLUBV RNA ISLT QL NAA+PROBE: NOT DETECTED
RSV RNA NPH QL NAA+NON-PROBE: NOT DETECTED
SARS-COV-2 RNA RESP QL NAA+PROBE: NOT DETECTED

## 2024-11-24 PROCEDURE — 71046 X-RAY EXAM CHEST 2 VIEWS: CPT

## 2024-11-24 PROCEDURE — G0463 HOSPITAL OUTPT CLINIC VISIT: HCPCS | Performed by: EMERGENCY MEDICINE

## 2024-11-24 PROCEDURE — 87637 SARSCOV2&INF A&B&RSV AMP PRB: CPT | Performed by: EMERGENCY MEDICINE

## 2024-11-24 RX ORDER — OXYMETAZOLINE HYDROCHLORIDE 0.05 G/100ML
2 SPRAY NASAL 2 TIMES DAILY
Qty: 30 ML | Refills: 0 | Status: SHIPPED | OUTPATIENT
Start: 2024-11-24

## 2024-11-24 RX ORDER — DEXTROMETHORPHAN HYDROBROMIDE AND PROMETHAZINE HYDROCHLORIDE 15; 6.25 MG/5ML; MG/5ML
5 SYRUP ORAL 4 TIMES DAILY PRN
Qty: 473 ML | Refills: 0 | Status: SHIPPED | OUTPATIENT
Start: 2024-11-24

## 2024-11-24 NOTE — FSED PROVIDER NOTE
Subjective   History of Present Illness  Patient with complaint of cough, congestion, runny nose with green nasal discharge for a cuople of days. No shortness of breath,abdominal pain, back pain, leg pain or swelling. No chest pain. No fever but chills. No ill contacts. No other complaints.     History provided by:  Patient and relative   used: No        Review of Systems   All other systems reviewed and are negative.      Past Medical History:   Diagnosis Date    Breast cancer     Diabetes mellitus     diet controlled    Disease of thyroid gland     GERD (gastroesophageal reflux disease)     History of transfusion     MI (myocardial infarction)     Sleep apnea        Allergies   Allergen Reactions    Lansoprazole Hives    Propoxyphene GI Intolerance    Tape Rash    Valium [Diazepam] Anxiety       Past Surgical History:   Procedure Laterality Date    BACK SURGERY      multiple    CHOLECYSTECTOMY      COLONOSCOPY N/A 07/22/2022    Procedure: COLONOSCOPY;  Surgeon: Chacha Clark MD;  Location: [facility] ENDOSCOPY;  Service: Gastroenterology;  Laterality: N/A;    DILATATION AND CURETTAGE      multiple    ENDOSCOPY N/A 07/22/2022    Procedure: ESOPHAGOGASTRODUODENOSCOPY;  Surgeon: Chacha Clark MD;  Location: [facility] ENDOSCOPY;  Service: Gastroenterology;  Laterality: N/A;    ERCP N/A 8/16/2022    Procedure: ENDOSCOPIC RETROGRADE CHOLANGIOPANCREATOGRAPHY;  Surgeon: Chacha Clark MD;  Location: [facility] ENDOSCOPY;  Service: Gastroenterology;  Laterality: N/A;    GASTRIC BYPASS      HYSTERECTOMY      KNEE SURGERY Bilateral     multiple    MASTECTOMY Bilateral     NECK SURGERY      multiple    RECTOCELE REPAIR      multiple       Family History   Problem Relation Age of Onset    Thyroid disease Other     Diabetes Other     Cancer Other     Heart disease Other        Social History     Socioeconomic History    Marital status:    Tobacco Use    Smoking status: Never    Smokeless tobacco:  Never   Vaping Use    Vaping status: Never Used   Substance and Sexual Activity    Alcohol use: Never    Drug use: Never    Sexual activity: Defer           Objective   Physical Exam  Vitals and nursing note reviewed.   Constitutional:       Appearance: Normal appearance.   HENT:      Head: Normocephalic.      Nose: Nose normal.      Mouth/Throat:      Mouth: Mucous membranes are moist.   Eyes:      Extraocular Movements: Extraocular movements intact.      Pupils: Pupils are equal, round, and reactive to light.   Pulmonary:      Effort: Pulmonary effort is normal.      Breath sounds: Normal breath sounds.   Skin:     General: Skin is warm.      Capillary Refill: Capillary refill takes less than 2 seconds.   Neurological:      General: No focal deficit present.      Mental Status: She is alert and oriented to person, place, and time.         Procedures           ED Course                                           Medical Decision Making  Concern for uri, sinusitis. D/w patient and daughter. Agree with plan.    Problems Addressed:  Acute non-recurrent sinusitis, unspecified location: complicated acute illness or injury  URI with cough and congestion: complicated acute illness or injury    Amount and/or Complexity of Data Reviewed  Labs: ordered.  Radiology: ordered.    Risk  OTC drugs.  Prescription drug management.        Final diagnoses:   Acute non-recurrent sinusitis, unspecified location   URI with cough and congestion       ED Disposition  ED Disposition       ED Disposition   Discharge    Condition   Stable    Comment   --               Yun Newman MD  [address] [place] [address]  [phone]    Schedule an appointment as soon as possible for a visit            Medication List        New Prescriptions      amoxicillin-clavulanate 875-125 MG per tablet  Commonly known as: AUGMENTIN  Take 1 tablet by mouth 2 (Two) Times a Day for 10 days.     oxymetazoline 0.05 % nasal spray  Commonly  known as: AFRIN  Administer 2 sprays into the nostril(s) as directed by provider 2 (Two) Times a Day.     promethazine-dextromethorphan 6.25-15 MG/5ML syrup  Commonly known as: PROMETHAZINE-DM  Take 5 mL by mouth 4 (Four) Times a Day As Needed for Cough.            Stop      cetirizine 10 MG tablet  Commonly known as: zyrTEC     hydrOXYzine 10 MG tablet  Commonly known as: ATARAX     meloxicam 15 MG tablet  Commonly known as: MOBIC     promethazine 25 MG tablet  Commonly known as: PHENERGAN               Where to Get Your Medications        These medications were sent to [facility]/pharmacy #[id number] - [place], [place] - [address] [place] [address] - [phone]  - [phone] [address] [place] [address]      Hours: 24-hours Phone: [phone]   amoxicillin-clavulanate 875-125 MG per tablet  oxymetazoline 0.05 % nasal spray  promethazine-dextromethorphan 6.25-15 MG/5ML syrup

## (undated) DEVICE — SINGLE-USE BIOPSY FORCEPS: Brand: RADIAL JAW 4

## (undated) DEVICE — DEV BIL POSITION RX LK OLYMPUS/FUJINON CAP SYS

## (undated) DEVICE — BITE BLOCKS

## (undated) DEVICE — BITEBLOCK ENDO W/STRAP 60F A/ LF DISP